# Patient Record
Sex: MALE | Race: WHITE | Employment: FULL TIME | URBAN - METROPOLITAN AREA
[De-identification: names, ages, dates, MRNs, and addresses within clinical notes are randomized per-mention and may not be internally consistent; named-entity substitution may affect disease eponyms.]

---

## 2017-08-20 ENCOUNTER — HOSPITAL ENCOUNTER (EMERGENCY)
Facility: CLINIC | Age: 47
Discharge: HOME | End: 2017-08-20
Attending: EMERGENCY MEDICINE | Admitting: EMERGENCY MEDICINE
Payer: COMMERCIAL

## 2017-08-20 VITALS
SYSTOLIC BLOOD PRESSURE: 172 MMHG | RESPIRATION RATE: 16 BRPM | DIASTOLIC BLOOD PRESSURE: 103 MMHG | OXYGEN SATURATION: 99 % | TEMPERATURE: 98.6 F

## 2017-08-20 DIAGNOSIS — K08.89 TOOTH PAIN: ICD-10-CM

## 2017-08-20 DIAGNOSIS — K04.7 DENTAL INFECTION: ICD-10-CM

## 2017-08-20 PROCEDURE — 99285 EMERGENCY DEPT VISIT HI MDM: CPT | Mod: 25

## 2017-08-20 PROCEDURE — 96372 THER/PROPH/DIAG INJ SC/IM: CPT

## 2017-08-20 PROCEDURE — 64400 NJX AA&/STRD TRIGEMINAL NRV: CPT

## 2017-08-20 PROCEDURE — 25000132 ZZH RX MED GY IP 250 OP 250 PS 637: Performed by: EMERGENCY MEDICINE

## 2017-08-20 PROCEDURE — 25000128 H RX IP 250 OP 636: Performed by: EMERGENCY MEDICINE

## 2017-08-20 RX ORDER — HYDROCODONE BITARTRATE AND ACETAMINOPHEN 5; 325 MG/1; MG/1
1 TABLET ORAL ONCE
Status: COMPLETED | OUTPATIENT
Start: 2017-08-20 | End: 2017-08-20

## 2017-08-20 RX ORDER — CLINDAMYCIN HCL 300 MG
300 CAPSULE ORAL 3 TIMES DAILY
Qty: 21 CAPSULE | Refills: 0 | Status: SHIPPED | OUTPATIENT
Start: 2017-08-20 | End: 2017-08-27

## 2017-08-20 RX ORDER — BUPIVACAINE HYDROCHLORIDE 5 MG/ML
1 INJECTION, SOLUTION PERINEURAL ONCE
Status: DISCONTINUED | OUTPATIENT
Start: 2017-08-20 | End: 2017-08-20 | Stop reason: HOSPADM

## 2017-08-20 RX ORDER — HYDROCODONE BITARTRATE AND ACETAMINOPHEN 5; 325 MG/1; MG/1
1 TABLET ORAL EVERY 4 HOURS PRN
Qty: 10 TABLET | Refills: 0 | Status: SHIPPED | OUTPATIENT
Start: 2017-08-20

## 2017-08-20 RX ADMIN — HYDROCODONE BITARTRATE AND ACETAMINOPHEN 1 TABLET: 5; 325 TABLET ORAL at 20:20

## 2017-08-20 RX ADMIN — HYDROMORPHONE HYDROCHLORIDE 2 MG: 1 INJECTION, SOLUTION INTRAMUSCULAR; INTRAVENOUS; SUBCUTANEOUS at 20:34

## 2017-08-20 ASSESSMENT — ENCOUNTER SYMPTOMS
SHORTNESS OF BREATH: 0
COUGH: 0
CHILLS: 0
FEVER: 0

## 2017-08-20 NOTE — ED AVS SNAPSHOT
[facility] Emergency Department    [address]    [facility] [address]    Phone:  [phone]    Fax:  [phone]                                       Kayden Schmitz   MRN: [id number]    Department:  [facility] Emergency Department   Date of Visit:  8/20/2017           After Visit Summary Signature Page     I have received my discharge instructions, and my questions have been answered. I have discussed any challenges I see with this plan with the nurse or doctor.    ..........................................................................................................................................  Patient/Patient Representative Signature      ..........................................................................................................................................  Patient Representative Print Name and Relationship to Patient    ..................................................               ................................................  Date                                            Time    ..........................................................................................................................................  Reviewed by Signature/Title    ...................................................              ..............................................  Date                                                            Time

## 2017-08-20 NOTE — ED AVS SNAPSHOT
[facility] Emergency Department    [address]    [facility] [address]    Phone:  [phone]    Fax:  [phone]                                       Kayden Schmitz   MRN: [id number]    Department:  [facility] Emergency Department   Date of Visit:  8/20/2017           Patient Information     Date Of Birth          1970        Your diagnoses for this visit were:     Dental infection     Tooth pain        You were seen by Sangita Askew MD.      Follow-up Information     Go to your dentist.    Why:  as previously scheduled for tomorrow        Follow up with [facility] Emergency Department.    Specialty:  EMERGENCY MEDICINE    Why:  if rapid facial swelling, difficulty breathing, vomiting/fevers, or other concerns    Contact information:    [address] [facility] [id number] [phone]        Discharge Instructions         Dental Abscess    A dental abscess is an infection of the tooth socket. It often starts with a crack or cavity in the tooth. A pocket of pus forms between the tooth and the bone. The infection causes pain and swelling of the gum, cheek, or jaw. The pain is often made worse by drinking hot or cold fluids, or biting on hard foods. Pain may be felt in the facial sinus or in the ear. A severe infection can interfere with swallowing and breathing.  Causes    Cavities    Trauma    Previous dental work  Symptoms    Pain    Swelling around the tooth or face and cheek    Redness    Bad breath    Bad taste in the mouth    Fever  You will be started on an antibiotic. However, final treatment requires drainage of the pus. This can be done by removing the tooth or performing a root canal. A root canal is done by an oral surgeon. It involves drilling an opening in the tooth to drain the pus. After the infection has healed, a crown is placed over the tooth.  Home care  The following guidelines will help you care for  "your abscess at home:    Avoid hot and cold foods and liquids, since your tooth may be sensitive to temperature changes.    If your tooth is chipped or cracked, or if there is a large open cavity, apply oil of cloves (available over-the-counter in drug stores) directly to the tooth to reduce pain. Some drugstores carry an over-the-counter \"toothache kit.\" This contains oil of cloves and a paste, which can be applied over the exposed tooth to decrease sensitivity.    Apply an ice pack (ice cubes in a plastic bag, wrapped in a towel) over the injured area for 10 to 20 minutes every 1 to 2 hours the first day for pain relief. Continue this 3 to 4 times a day until the pain and swelling goes away.    You can take acetaminophen or ibuprofen for pain, unless you were given a different pain medicine to use. (Note: If you have chronic liver or kidney disease, have ever had a stomach ulcer or gastrointestinal bleeding, or are taking blood-thinning medicines, talk with your healthcare provider before using these medicines.)    An antibiotic will be prescribed. Take it as directed until completed, even if you are feeling better sooner.  Follow-up care  Follow up as advised with a dentist or oral surgeon. Even though your pain may improve with the treatment given today, only a dentist or oral surgeon can provide full treatment for this problem.    If a culture was done, you will be notified if the treatment needs to be changed. You can call in as directed for the results.    If X-rays were taken, they will be reviewed by a specialist. You will be notified of the results, especially if they affect treatment.  Call 911  Call emergency services right away if any of these occur:    Trouble breathing or swallowing, or wheezing    Hoarse voice or trouble speaking    Confusion    Extreme drowsiness or trouble awakening    Fainting or loss of consciousness    Rapid heart rate  When to seek medical advice  Call your healthcare provider " right away if any of these occur:    Your face or eyelid becomes swollen or red.    Pain worsens or spreads to the neck.    You develop a fever of 100.4 F (38 C) or higher.    You have unusual drowsiness, a headache or stiff neck, or weakness.    Pus drains from the gum or tooth.    You are unable to open your mouth wide.  Date Last Reviewed: 7/30/2015 2000-2017 [employer]. [address] [place] [address], [place] [address]. All rights reserved. This information is not intended as a substitute for professional medical care. Always follow your healthcare professional's instructions.          24 Hour Appointment Hotline       To make an appointment at any [facility], call [phone] ([phone]). If you don't have a family doctor or clinic, we will help you find one. [facility] clinics are conveniently located to serve the needs of you and your family.             Review of your medicines      START taking        Dose / Directions Last dose taken    clindamycin 300 MG capsule   Commonly known as:  CLEOCIN   Dose:  300 mg   Quantity:  21 capsule        Take 1 capsule (300 mg) by mouth 3 times daily for 7 days   Refills:  0        HYDROcodone-acetaminophen 5-325 MG per tablet   Commonly known as:  NORCO   Dose:  1 tablet   Quantity:  10 tablet        Take 1 tablet by mouth every 4 hours as needed for moderate to severe pain   Refills:  0          Our records show that you are taking the medicines listed below. If these are incorrect, please call your family doctor or clinic.        Dose / Directions Last dose taken    diphenhydrAMINE 25 MG tablet   Commonly known as:  BENADRYL   Dose:  50 mg   Quantity:  56 tablet        Take 2 tablets (50 mg) by mouth every 6 hours as needed for itching   Refills:  0        EPINEPHrine 0.3 MG/0.3ML injection 2-pack   Commonly known as:  EPIPEN/ADRENACLICK/or ANY BX GENERIC EQUIV   Dose:  0.3 mg   Quantity:  2 each        Inject 0.3 mLs (0.3 mg) into the muscle once  as needed for anaphylaxis   Refills:  0        MEDROL (KAREN) 4 MG tablet   Quantity:  21   Generic drug:  methylPREDNISolone        AS DIRECTED   Refills:  0        multivitamin per tablet   Quantity:  100        ONE DAILY   Refills:  1 YEAR        predniSONE 20 MG tablet   Commonly known as:  DELTASONE   Quantity:  9 tablet        Take three tablets (= 60mg) each day for three days   Refills:  0        ROBITUSSIN A-C  MG/5ML Syrp   Quantity:  8 OZ   Generic drug:  CODEINE-GUAIFENESIN        ONE TO TWO TEASPOONS EVER 4 HOURS, AS NEEDED FOR COUGH   Refills:  0        ZITHROMAX Z-KAREN 250 MG Caps   Quantity:  6        2 CAPSULES TODAY, THEN 1 CAPSULE DAILY   Refills:  0                Prescriptions were sent or printed at these locations (2 Prescriptions)                   Other Prescriptions                Printed at Department/Unit printer (2 of 2)         clindamycin (CLEOCIN) 300 MG capsule               HYDROcodone-acetaminophen (NORCO) 5-325 MG per tablet                Orders Needing Specimen Collection     None      Pending Results     No orders found from 8/18/2017 to 8/21/2017.            Pending Culture Results     No orders found from 8/18/2017 to 8/21/2017.            Pending Results Instructions     If you had any lab results that were not finalized at the time of your Discharge, you can call the ED Lab Result RN at [phone]. You will be contacted by this team for any positive Lab results or changes in treatment. The nurses are available 7 days a week from 10A to 6:30P.  You can leave a message 24 hours per day and they will return your call.        Test Results From Your Hospital Stay               Clinical Quality Measure: Blood Pressure Screening     Your blood pressure was checked while you were in the emergency department today. The last reading we obtained was  BP: (!) 193/103 . Please read the guidelines below about what these numbers mean and what you should do about them.  If your  "systolic blood pressure (the top number) is less than 120 and your diastolic blood pressure (the bottom number) is less than 80, then your blood pressure is normal. There is nothing more that you need to do about it.  If your systolic blood pressure (the top number) is 120-139 or your diastolic blood pressure (the bottom number) is 80-89, your blood pressure may be higher than it should be. You should have your blood pressure rechecked within a year by a primary care provider.  If your systolic blood pressure (the top number) is 140 or greater or your diastolic blood pressure (the bottom number) is 90 or greater, you may have high blood pressure. High blood pressure is treatable, but if left untreated over time it can put you at risk for heart attack, stroke, or kidney failure. You should have your blood pressure rechecked by a primary care provider within the next 4 weeks.  If your provider in the emergency department today gave you specific instructions to follow-up with your doctor or provider even sooner than that, you should follow that instruction and not wait for up to 4 weeks for your follow-up visit.        Thank you for choosing [facility]       Thank you for choosing [facility] for your care. Our goal is always to provide you with excellent care. Hearing back from our patients is one way we can continue to improve our services. Please take a few minutes to complete the written survey that you may receive in the mail after you visit with us. Thank you!        [employer] Information     [employer] lets you send messages to your doctor, view your test results, renew your prescriptions, schedule appointments and more. To sign up, go to [email or web address] . Click on \"Log in\" on the left side of the screen, which will take you to the Welcome page. Then click on \"Sign up Now\" on the right side of the page.     You will be asked to enter the access code listed below, as well as some personal information. Please " follow the directions to create your username and password.     Your access code is: [email or web address]  Expires: 2017  8:14 PM     Your access code will  in 90 days. If you need help or a new code, please call your [facility] clinic or [phone].        Care EveryWhere ID     This is your Care EveryWhere ID. This could be used by other organizations to access your [facility] medical records  [id number]        Equal Access to Services     GINA DEVRIES : Hadii aad ku hadasho Soomaali, waaxda luqadaha, qaybta kaalmada adeegyachrissie, corey canales . So [facility] [phone].    ATENCIÓN: Si habla español, tiene a brandt disposición servicios gratuitos de asistencia lingüística. Llame al [phone].    We comply with applicable federal civil rights laws and [place] laws. We do not discriminate on the basis of race, color, national origin, age, disability sex, sexual orientation or gender identity.            After Visit Summary       This is your record. Keep this with you and show to your community pharmacist(s) and doctor(s) at your next visit.

## 2017-08-20 NOTE — ED NOTES
Patient states he took Amoxicillin at 1620 for dental pain. Patient states pain has increased significantly as well. Patient states he started feeling his lips swelling, as well as facial swelling. No trouble breathing. ABCDs intact. Alert and oriented x 4.

## 2017-08-21 NOTE — ED NOTES
Pt stating that he is not here for an allergic reaction, he is here strictly for pain management.  MD at bedside and aware.  Pt denies any difficulty breathing, trouble handling secretions, rashes/hives, or facial numbness.

## 2017-08-21 NOTE — DISCHARGE INSTRUCTIONS
"  Dental Abscess    A dental abscess is an infection of the tooth socket. It often starts with a crack or cavity in the tooth. A pocket of pus forms between the tooth and the bone. The infection causes pain and swelling of the gum, cheek, or jaw. The pain is often made worse by drinking hot or cold fluids, or biting on hard foods. Pain may be felt in the facial sinus or in the ear. A severe infection can interfere with swallowing and breathing.  Causes    Cavities    Trauma    Previous dental work  Symptoms    Pain    Swelling around the tooth or face and cheek    Redness    Bad breath    Bad taste in the mouth    Fever  You will be started on an antibiotic. However, final treatment requires drainage of the pus. This can be done by removing the tooth or performing a root canal. A root canal is done by an oral surgeon. It involves drilling an opening in the tooth to drain the pus. After the infection has healed, a crown is placed over the tooth.  Home care  The following guidelines will help you care for your abscess at home:    Avoid hot and cold foods and liquids, since your tooth may be sensitive to temperature changes.    If your tooth is chipped or cracked, or if there is a large open cavity, apply oil of cloves (available over-the-counter in drug stores) directly to the tooth to reduce pain. Some drugstores carry an over-the-counter \"toothache kit.\" This contains oil of cloves and a paste, which can be applied over the exposed tooth to decrease sensitivity.    Apply an ice pack (ice cubes in a plastic bag, wrapped in a towel) over the injured area for 10 to 20 minutes every 1 to 2 hours the first day for pain relief. Continue this 3 to 4 times a day until the pain and swelling goes away.    You can take acetaminophen or ibuprofen for pain, unless you were given a different pain medicine to use. (Note: If you have chronic liver or kidney disease, have ever had a stomach ulcer or gastrointestinal bleeding, or are " taking blood-thinning medicines, talk with your healthcare provider before using these medicines.)    An antibiotic will be prescribed. Take it as directed until completed, even if you are feeling better sooner.  Follow-up care  Follow up as advised with a dentist or oral surgeon. Even though your pain may improve with the treatment given today, only a dentist or oral surgeon can provide full treatment for this problem.    If a culture was done, you will be notified if the treatment needs to be changed. You can call in as directed for the results.    If X-rays were taken, they will be reviewed by a specialist. You will be notified of the results, especially if they affect treatment.  Call 911  Call emergency services right away if any of these occur:    Trouble breathing or swallowing, or wheezing    Hoarse voice or trouble speaking    Confusion    Extreme drowsiness or trouble awakening    Fainting or loss of consciousness    Rapid heart rate  When to seek medical advice  Call your healthcare provider right away if any of these occur:    Your face or eyelid becomes swollen or red.    Pain worsens or spreads to the neck.    You develop a fever of 100.4 F (38 C) or higher.    You have unusual drowsiness, a headache or stiff neck, or weakness.    Pus drains from the gum or tooth.    You are unable to open your mouth wide.  Date Last Reviewed: 7/30/2015 2000-2017 [employer]. [address] [place] [address], [place] [address]. All rights reserved. This information is not intended as a substitute for professional medical care. Always follow your healthcare professional's instructions.

## 2017-08-21 NOTE — ED PROVIDER NOTES
History     Chief Complaint:  Allergic Reaction and Dental Pain    HPI   Kayden Schmitz is a 47 year old male who presents to the ED for evaluation of an allergic reaction and dental pain. The patient called the On-call dentist here today and was prescribed amoxicillin for his dental pain. The dental pain is his main concern here today. The patient reports that his 2nd to last molar on the left bottom side of his jaw is painful and has been progressively getting worse since its onset on Thursday. The patient notes his took the first does of amoxicillin at 4:20pm today and noticed lip swelling on the left side. The patient denies any trouble breathing or rash.  He has never had reaction to penicillins before.  Denies wheezing, abdominal pain, and vomiting.  He notes that he has had teeth pulled previously and a root canal conducted. Of note he has a dentist appointment scheduled for tomorrow morning.    Allergies:  Lisinopril     Medications:    Benadryl  Deltasone  Epipen  Robitussin  Zithromax  Medrol    Past Medical History:    Obesity    Past Surgical History:    Pilonidal Cyst Surgery  Hernia Repair  Gastric Bypass    Family History:    ALS    Social History:  Smoking status: Former, quit 2002  Alcohol use: Yes, 1 drink/month  Marital Status:   [2]     Review of Systems   Constitutional: Negative for chills and fever.   HENT: Positive for dental problem.    Respiratory: Negative for cough and shortness of breath.    Skin: Negative for rash.   All other systems reviewed and are negative.      Physical Exam     Patient Vitals for the past 24 hrs:   BP Temp Temp src Heart Rate Resp SpO2   08/20/17 1854 (!) 193/103 98.6  F (37  C) Oral 71 16 97 %       Physical Exam  Constitutional: Alert, attentive, GCS 15  HENT: no external facial or neck swelling   Nose: Nose normal.    Mouth/Throat: Oropharynx is clear, mucous membranes are moist, tooth #16 and tooth #17 tender to palpation (left mandibular  molars), erythema and edema along gumline, no drainage abscess, lips appear normal  Eyes: Normal conjunctiva. Pupils are equal, round, and reactive to light.   CV: regular rate and rhythm; no murmurs, rubs or gallups  Chest: Effort normal and breath sounds normal,  No wheezing, rhonchi or rales.   GI:  There is no tenderness. No distension. Normal bowel sounds  MSK: Normal range of motion.   Neurological: Alert, attentive  Skin: Skin is warm and dry, no hives or rash.      Emergency Department Course   Procedures:  PROCEDURE:  Dental Block  LOCATION:  Left inferior alveolar  ANESTHESIA: Regional block using 0.5% bupivacaine, total of 4 mL (2 attempts)   PROCEDURE NOTE: The patient tolerated the procedure well. Second attempt was performed as there was no relief with the first.  He did have a needle prick of his left upper lip with the second attempt.  Patient given gauze pad to hold pressure and bleeding stopped.      Emergency Department Course:  Past medical records, nursing notes, and vitals reviewed.  7:07pm: I performed an exam of the patient and obtained history, as documented above.    7:33pm: I performed a dental block procedure as indicated above.  8:08pm: I rechecked the patient. Explained findings to the patient.  Did not have pain relief with first dental block, so I injected 2 cc of bupivicaine with second attempt.  Still no relief.  Patient declined PO medication and requested IM.      I rechecked the patient. Findings and plan explained to the Patient. Patient discharged home with instructions regarding supportive care, medications, and reasons to return. The importance of close follow-up was reviewed.     Impression & Plan    Medical Decision Making:  The patient presents with a toothache and pain.  There is no abscess detected around the tooth amenable to incision and drainage. The differential diagnosis includes: cracked tooth syndrome, pulpitis, sub-apical abscess, amongst others. There is no  evidence of neck swelling, significant facial swelling, or Vivek's angina.  Patient did feel like his lips were slightly swollen after taking a dose of amoxicillin several hours before arrival.  There is no evidence of anaphylaxis on exam and he is not having any additional symptoms such as vomiting, abdominal pain, shortness of breath, rash, or wheezing.  Because he is concerned about continuing amoxicillin, I will change his antibiotic to Clindamycin. Patient did not have pain relief with nerve block and was given dose of IM dilaudid after he declined PO medications, as he feels they do not work well on him since his gastric bypass.   He has dental appointment tomorrow.   Return precautions discussed.      Diagnosis:    ICD-10-CM   1. Dental infection K04.7   2. Tooth pain K08.89       Disposition:  discharged to home    Discharge Medications:  New Prescriptions    CLINDAMYCIN (CLEOCIN) 300 MG CAPSULE    Take 1 capsule (300 mg) by mouth 3 times daily for 7 days    HYDROCODONE-ACETAMINOPHEN (NORCO) 5-325 MG PER TABLET    Take 1 tablet by mouth every 4 hours as needed for moderate to severe pain         Radha Gil  8/20/2017   [facility] EMERGENCY DEPARTMENT  Radha SMITH, am serving as a scribe at 7:07 PM on 8/20/2017 to document services personally performed by Sangita Askew MD based on my observations and the provider's statements to me.        Sangita Askew MD  08/20/17 2051

## 2017-08-21 NOTE — ED NOTES
Pt currently refusing Norco, stating that he wants to see how much relief he gets from the dental block.

## 2018-07-24 ENCOUNTER — HOSPITAL ENCOUNTER (OUTPATIENT)
Facility: CLINIC | Age: 48
Setting detail: OBSERVATION
Discharge: HOME | End: 2018-07-25
Attending: EMERGENCY MEDICINE | Admitting: INTERNAL MEDICINE
Payer: COMMERCIAL

## 2018-07-24 DIAGNOSIS — A04.72 C. DIFFICILE COLITIS: Primary | ICD-10-CM

## 2018-07-24 DIAGNOSIS — K92.2 GASTROINTESTINAL HEMORRHAGE, UNSPECIFIED GASTROINTESTINAL HEMORRHAGE TYPE: ICD-10-CM

## 2018-07-24 PROCEDURE — 85610 PROTHROMBIN TIME: CPT | Performed by: EMERGENCY MEDICINE

## 2018-07-24 PROCEDURE — 99285 EMERGENCY DEPT VISIT HI MDM: CPT | Mod: 25

## 2018-07-24 PROCEDURE — 85025 COMPLETE CBC W/AUTO DIFF WBC: CPT | Performed by: EMERGENCY MEDICINE

## 2018-07-24 PROCEDURE — 80053 COMPREHEN METABOLIC PANEL: CPT | Performed by: EMERGENCY MEDICINE

## 2018-07-24 PROCEDURE — 96374 THER/PROPH/DIAG INJ IV PUSH: CPT

## 2018-07-24 PROCEDURE — 83605 ASSAY OF LACTIC ACID: CPT | Performed by: EMERGENCY MEDICINE

## 2018-07-24 PROCEDURE — 86900 BLOOD TYPING SEROLOGIC ABO: CPT | Performed by: EMERGENCY MEDICINE

## 2018-07-24 PROCEDURE — 86850 RBC ANTIBODY SCREEN: CPT | Performed by: EMERGENCY MEDICINE

## 2018-07-24 PROCEDURE — 83690 ASSAY OF LIPASE: CPT | Performed by: EMERGENCY MEDICINE

## 2018-07-24 PROCEDURE — 86901 BLOOD TYPING SEROLOGIC RH(D): CPT | Performed by: EMERGENCY MEDICINE

## 2018-07-24 RX ORDER — ONDANSETRON 2 MG/ML
4 INJECTION INTRAMUSCULAR; INTRAVENOUS EVERY 30 MIN PRN
Status: DISCONTINUED | OUTPATIENT
Start: 2018-07-24 | End: 2018-07-25

## 2018-07-24 ASSESSMENT — ENCOUNTER SYMPTOMS
NAUSEA: 1
BLOOD IN STOOL: 1
FATIGUE: 1
APPETITE CHANGE: 1
DIAPHORESIS: 1

## 2018-07-24 NOTE — IP AVS SNAPSHOT
[facility] [facility]    [address]    [facility] [address]    Phone:  [phone]    Fax:  [phone]                                       After Visit Summary   7/24/2018    Kayden Schmitz    MRN: [id number]           After Visit Summary Signature Page     I have received my discharge instructions, and my questions have been answered. I have discussed any challenges I see with this plan with the nurse or doctor.    ..........................................................................................................................................  Patient/Patient Representative Signature      ..........................................................................................................................................  Patient Representative Print Name and Relationship to Patient    ..................................................               ................................................  Date                                            Time    ..........................................................................................................................................  Reviewed by Signature/Title    ...................................................              ..............................................  Date                                                            Time

## 2018-07-25 ENCOUNTER — APPOINTMENT (OUTPATIENT)
Dept: CT IMAGING | Facility: CLINIC | Age: 48
End: 2018-07-25
Attending: EMERGENCY MEDICINE
Payer: COMMERCIAL

## 2018-07-25 VITALS
TEMPERATURE: 97.7 F | HEIGHT: 67 IN | OXYGEN SATURATION: 99 % | RESPIRATION RATE: 16 BRPM | WEIGHT: 197.9 LBS | SYSTOLIC BLOOD PRESSURE: 135 MMHG | DIASTOLIC BLOOD PRESSURE: 74 MMHG | BODY MASS INDEX: 31.06 KG/M2 | HEART RATE: 83 BPM

## 2018-07-25 PROBLEM — K92.2 ACUTE GASTROINTESTINAL HEMORRHAGE: Status: ACTIVE | Noted: 2018-07-25

## 2018-07-25 PROBLEM — A04.72 C. DIFFICILE COLITIS: Status: ACTIVE | Noted: 2018-07-25

## 2018-07-25 LAB
ABO + RH BLD: NORMAL
ABO + RH BLD: NORMAL
ALBUMIN SERPL-MCNC: 3.4 G/DL (ref 3.4–5)
ALP SERPL-CCNC: 72 U/L (ref 40–150)
ALT SERPL W P-5'-P-CCNC: 27 U/L (ref 0–70)
ANION GAP SERPL CALCULATED.3IONS-SCNC: 4 MMOL/L (ref 3–14)
AST SERPL W P-5'-P-CCNC: 17 U/L (ref 0–45)
BASOPHILS # BLD AUTO: 0.1 10E9/L (ref 0–0.2)
BASOPHILS NFR BLD AUTO: 0.4 %
BILIRUB SERPL-MCNC: 0.6 MG/DL (ref 0.2–1.3)
BLD GP AB SCN SERPL QL: NORMAL
BLOOD BANK CMNT PATIENT-IMP: NORMAL
BUN SERPL-MCNC: 25 MG/DL (ref 7–30)
C COLI+JEJUNI+LARI FUSA STL QL NAA+PROBE: NOT DETECTED
C DIFF TOX B STL QL: POSITIVE
CALCIUM SERPL-MCNC: 8.3 MG/DL (ref 8.5–10.1)
CHLORIDE SERPL-SCNC: 103 MMOL/L (ref 94–109)
CO2 SERPL-SCNC: 29 MMOL/L (ref 20–32)
CREAT SERPL-MCNC: 0.8 MG/DL (ref 0.66–1.25)
DIFFERENTIAL METHOD BLD: ABNORMAL
EC STX1 GENE STL QL NAA+PROBE: NOT DETECTED
EC STX2 GENE STL QL NAA+PROBE: NOT DETECTED
ENTERIC PATHOGEN COMMENT: NORMAL
EOSINOPHIL # BLD AUTO: 0.1 10E9/L (ref 0–0.7)
EOSINOPHIL NFR BLD AUTO: 0.5 %
ERYTHROCYTE [DISTWIDTH] IN BLOOD BY AUTOMATED COUNT: 14.1 % (ref 10–15)
GFR SERPL CREATININE-BSD FRML MDRD: >90 ML/MIN/1.7M2
GLUCOSE SERPL-MCNC: 124 MG/DL (ref 70–99)
HCT VFR BLD AUTO: 36.8 % (ref 40–53)
HGB BLD-MCNC: 11.5 G/DL (ref 13.3–17.7)
HGB BLD-MCNC: 12 G/DL (ref 13.3–17.7)
HGB BLD-MCNC: 12.2 G/DL (ref 13.3–17.7)
IMM GRANULOCYTES # BLD: 0.1 10E9/L (ref 0–0.4)
IMM GRANULOCYTES NFR BLD: 0.5 %
INR PPP: 1.26 (ref 0.86–1.14)
LACTATE BLD-SCNC: 0.8 MMOL/L (ref 0.7–2)
LIPASE SERPL-CCNC: 95 U/L (ref 73–393)
LYMPHOCYTES # BLD AUTO: 2.1 10E9/L (ref 0.8–5.3)
LYMPHOCYTES NFR BLD AUTO: 17.9 %
MCH RBC QN AUTO: 29.1 PG (ref 26.5–33)
MCHC RBC AUTO-ENTMCNC: 33.2 G/DL (ref 31.5–36.5)
MCV RBC AUTO: 88 FL (ref 78–100)
MONOCYTES # BLD AUTO: 0.8 10E9/L (ref 0–1.3)
MONOCYTES NFR BLD AUTO: 6.7 %
NEUTROPHILS # BLD AUTO: 8.7 10E9/L (ref 1.6–8.3)
NEUTROPHILS NFR BLD AUTO: 74 %
NOROV GI+II ORF1-ORF2 JNC STL QL NAA+PR: NOT DETECTED
NRBC # BLD AUTO: 0 10*3/UL
NRBC BLD AUTO-RTO: 0 /100
PLATELET # BLD AUTO: 306 10E9/L (ref 150–450)
POTASSIUM SERPL-SCNC: 4 MMOL/L (ref 3.4–5.3)
PROT SERPL-MCNC: 6.9 G/DL (ref 6.8–8.8)
RBC # BLD AUTO: 4.19 10E12/L (ref 4.4–5.9)
RVA NSP5 STL QL NAA+PROBE: NOT DETECTED
SALMONELLA SP RPOD STL QL NAA+PROBE: NOT DETECTED
SHIGELLA SP+EIEC IPAH STL QL NAA+PROBE: NOT DETECTED
SODIUM SERPL-SCNC: 136 MMOL/L (ref 133–144)
SPECIMEN EXP DATE BLD: NORMAL
SPECIMEN SOURCE: ABNORMAL
UPPER GI ENDOSCOPY: NORMAL
V CHOL+PARA RFBL+TRKH+TNAA STL QL NAA+PR: NOT DETECTED
WBC # BLD AUTO: 11.8 10E9/L (ref 4–11)
Y ENTERO RECN STL QL NAA+PROBE: NOT DETECTED

## 2018-07-25 PROCEDURE — 25000128 H RX IP 250 OP 636: Performed by: EMERGENCY MEDICINE

## 2018-07-25 PROCEDURE — 36415 COLL VENOUS BLD VENIPUNCTURE: CPT | Performed by: INTERNAL MEDICINE

## 2018-07-25 PROCEDURE — 96374 THER/PROPH/DIAG INJ IV PUSH: CPT

## 2018-07-25 PROCEDURE — 0DB68ZX EXCISION OF STOMACH, VIA NATURAL OR ARTIFICIAL OPENING ENDOSCOPIC, DIAGNOSTIC: ICD-10-PCS | Performed by: INTERNAL MEDICINE

## 2018-07-25 PROCEDURE — 25000125 ZZHC RX 250: Performed by: EMERGENCY MEDICINE

## 2018-07-25 PROCEDURE — 88305 TISSUE EXAM BY PATHOLOGIST: CPT | Performed by: INTERNAL MEDICINE

## 2018-07-25 PROCEDURE — 87506 IADNA-DNA/RNA PROBE TQ 6-11: CPT | Performed by: EMERGENCY MEDICINE

## 2018-07-25 PROCEDURE — G0378 HOSPITAL OBSERVATION PER HR: HCPCS

## 2018-07-25 PROCEDURE — 88305 TISSUE EXAM BY PATHOLOGIST: CPT | Mod: 26 | Performed by: INTERNAL MEDICINE

## 2018-07-25 PROCEDURE — 74177 CT ABD & PELVIS W/CONTRAST: CPT

## 2018-07-25 PROCEDURE — 87493 C DIFF AMPLIFIED PROBE: CPT | Performed by: EMERGENCY MEDICINE

## 2018-07-25 PROCEDURE — 43239 EGD BIOPSY SINGLE/MULTIPLE: CPT | Performed by: INTERNAL MEDICINE

## 2018-07-25 PROCEDURE — 25000125 ZZHC RX 250: Performed by: INTERNAL MEDICINE

## 2018-07-25 PROCEDURE — 25000128 H RX IP 250 OP 636: Performed by: INTERNAL MEDICINE

## 2018-07-25 PROCEDURE — C9113 INJ PANTOPRAZOLE SODIUM, VIA: HCPCS | Performed by: INTERNAL MEDICINE

## 2018-07-25 PROCEDURE — 85018 HEMOGLOBIN: CPT | Performed by: INTERNAL MEDICINE

## 2018-07-25 PROCEDURE — 25000132 ZZH RX MED GY IP 250 OP 250 PS 637: Performed by: INTERNAL MEDICINE

## 2018-07-25 PROCEDURE — G0500 MOD SEDAT ENDO SERVICE >5YRS: HCPCS | Performed by: INTERNAL MEDICINE

## 2018-07-25 PROCEDURE — 87177 OVA AND PARASITES SMEARS: CPT | Performed by: EMERGENCY MEDICINE

## 2018-07-25 PROCEDURE — 12000007 ZZH R&B INTERMEDIATE

## 2018-07-25 PROCEDURE — 87209 SMEAR COMPLEX STAIN: CPT | Performed by: EMERGENCY MEDICINE

## 2018-07-25 PROCEDURE — 25800025 ZZH RX 258: Performed by: INTERNAL MEDICINE

## 2018-07-25 PROCEDURE — 99222 1ST HOSP IP/OBS MODERATE 55: CPT | Mod: AI | Performed by: INTERNAL MEDICINE

## 2018-07-25 RX ORDER — DEXTROSE MONOHYDRATE, SODIUM CHLORIDE, AND POTASSIUM CHLORIDE 50; 1.49; 4.5 G/1000ML; G/1000ML; G/1000ML
INJECTION, SOLUTION INTRAVENOUS CONTINUOUS
Status: DISCONTINUED | OUTPATIENT
Start: 2018-07-25 | End: 2018-07-25

## 2018-07-25 RX ORDER — HYDROCODONE BITARTRATE AND ACETAMINOPHEN 5; 325 MG/1; MG/1
1 TABLET ORAL EVERY 4 HOURS PRN
Status: DISCONTINUED | OUTPATIENT
Start: 2018-07-25 | End: 2018-07-25 | Stop reason: HOSPADM

## 2018-07-25 RX ORDER — CHOLECALCIFEROL (VITAMIN D3) 50 MCG
1 TABLET ORAL DAILY
COMMUNITY

## 2018-07-25 RX ORDER — ALBUTEROL SULFATE 90 UG/1
1-2 AEROSOL, METERED RESPIRATORY (INHALATION) EVERY 4 HOURS PRN
COMMUNITY

## 2018-07-25 RX ORDER — NALOXONE HYDROCHLORIDE 0.4 MG/ML
.1-.4 INJECTION, SOLUTION INTRAMUSCULAR; INTRAVENOUS; SUBCUTANEOUS
Status: DISCONTINUED | OUTPATIENT
Start: 2018-07-25 | End: 2018-07-25 | Stop reason: HOSPADM

## 2018-07-25 RX ORDER — IOPAMIDOL 755 MG/ML
500 INJECTION, SOLUTION INTRAVASCULAR ONCE
Status: COMPLETED | OUTPATIENT
Start: 2018-07-25 | End: 2018-07-25

## 2018-07-25 RX ORDER — NICOTINE POLACRILEX 4 MG/1
20 GUM, CHEWING ORAL DAILY
COMMUNITY

## 2018-07-25 RX ORDER — MAGNESIUM 200 MG
1000 TABLET ORAL WEEKLY
COMMUNITY

## 2018-07-25 RX ORDER — FLUMAZENIL 0.1 MG/ML
0.2 INJECTION, SOLUTION INTRAVENOUS
Status: DISCONTINUED | OUTPATIENT
Start: 2018-07-25 | End: 2018-07-25 | Stop reason: HOSPADM

## 2018-07-25 RX ORDER — FENTANYL CITRATE 50 UG/ML
INJECTION, SOLUTION INTRAMUSCULAR; INTRAVENOUS PRN
Status: DISCONTINUED | OUTPATIENT
Start: 2018-07-25 | End: 2018-07-25 | Stop reason: HOSPADM

## 2018-07-25 RX ORDER — SILDENAFIL 100 MG/1
50-100 TABLET, FILM COATED ORAL DAILY PRN
COMMUNITY

## 2018-07-25 RX ORDER — ACETAMINOPHEN 500 MG
500 TABLET ORAL EVERY 6 HOURS PRN
COMMUNITY

## 2018-07-25 RX ORDER — ACETAMINOPHEN 325 MG/1
650 TABLET ORAL EVERY 4 HOURS PRN
Status: DISCONTINUED | OUTPATIENT
Start: 2018-07-25 | End: 2018-07-25 | Stop reason: HOSPADM

## 2018-07-25 RX ORDER — VANCOMYCIN HYDROCHLORIDE 50 MG/ML
125 KIT ORAL 4 TIMES DAILY
Qty: 100 ML | Refills: 0 | Status: SHIPPED | OUTPATIENT
Start: 2018-07-25 | End: 2018-08-04

## 2018-07-25 RX ORDER — FEXOFENADINE HCL 180 MG/1
180 TABLET ORAL DAILY PRN
COMMUNITY

## 2018-07-25 RX ORDER — ONDANSETRON 4 MG/1
4 TABLET, ORALLY DISINTEGRATING ORAL EVERY 6 HOURS PRN
Status: DISCONTINUED | OUTPATIENT
Start: 2018-07-25 | End: 2018-07-25 | Stop reason: HOSPADM

## 2018-07-25 RX ORDER — ONDANSETRON 2 MG/ML
4 INJECTION INTRAMUSCULAR; INTRAVENOUS EVERY 6 HOURS PRN
Status: DISCONTINUED | OUTPATIENT
Start: 2018-07-25 | End: 2018-07-25 | Stop reason: HOSPADM

## 2018-07-25 RX ORDER — FERROUS SULFATE 325(65) MG
325 TABLET ORAL 2 TIMES DAILY
COMMUNITY

## 2018-07-25 RX ORDER — LIDOCAINE 40 MG/G
CREAM TOPICAL
Status: DISCONTINUED | OUTPATIENT
Start: 2018-07-25 | End: 2018-07-25 | Stop reason: HOSPADM

## 2018-07-25 RX ORDER — VANCOMYCIN HYDROCHLORIDE 50 MG/ML
125 KIT ORAL 4 TIMES DAILY
Status: DISCONTINUED | OUTPATIENT
Start: 2018-07-25 | End: 2018-07-25 | Stop reason: HOSPADM

## 2018-07-25 RX ORDER — CETIRIZINE HYDROCHLORIDE 10 MG/1
10 TABLET ORAL DAILY
COMMUNITY

## 2018-07-25 RX ORDER — IBUPROFEN 200 MG
200-400 TABLET ORAL EVERY 4 HOURS PRN
Status: ON HOLD | COMMUNITY
End: 2018-07-25

## 2018-07-25 RX ADMIN — HYDROCODONE BITARTRATE AND ACETAMINOPHEN 1 TABLET: 5; 325 TABLET ORAL at 03:25

## 2018-07-25 RX ADMIN — IOPAMIDOL 100 ML: 755 INJECTION, SOLUTION INTRAVENOUS at 00:39

## 2018-07-25 RX ADMIN — Medication 1 MG: at 03:25

## 2018-07-25 RX ADMIN — POTASSIUM CHLORIDE, DEXTROSE MONOHYDRATE AND SODIUM CHLORIDE: 150; 5; 450 INJECTION, SOLUTION INTRAVENOUS at 14:51

## 2018-07-25 RX ADMIN — SODIUM CHLORIDE 65 ML: 900 INJECTION, SOLUTION INTRAVENOUS at 00:39

## 2018-07-25 RX ADMIN — POTASSIUM CHLORIDE, DEXTROSE MONOHYDRATE AND SODIUM CHLORIDE: 150; 5; 450 INJECTION, SOLUTION INTRAVENOUS at 03:25

## 2018-07-25 RX ADMIN — PANTOPRAZOLE SODIUM 40 MG: 40 INJECTION, POWDER, FOR SOLUTION INTRAVENOUS at 13:05

## 2018-07-25 RX ADMIN — VANCOMYCIN HYDROCHLORIDE 125 MG: KIT at 18:07

## 2018-07-25 RX ADMIN — PANTOPRAZOLE SODIUM 40 MG: 40 INJECTION, POWDER, FOR SOLUTION INTRAVENOUS at 01:23

## 2018-07-25 ASSESSMENT — ACTIVITIES OF DAILY LIVING (ADL)
RETIRED_EATING: 0-->INDEPENDENT
BATHING: 0-->INDEPENDENT
ADLS_ACUITY_SCORE: 9
SWALLOWING: 0-->SWALLOWS FOODS/LIQUIDS WITHOUT DIFFICULTY
ADLS_ACUITY_SCORE: 9
TRANSFERRING: 0-->INDEPENDENT
ADLS_ACUITY_SCORE: 13
TOILETING: 0-->INDEPENDENT
AMBULATION: 0-->INDEPENDENT
COGNITION: 0 - NO COGNITION ISSUES REPORTED
FALL_HISTORY_WITHIN_LAST_SIX_MONTHS: NO
ADLS_ACUITY_SCORE: 9
DRESS: 0-->INDEPENDENT
RETIRED_COMMUNICATION: 0-->UNDERSTANDS/COMMUNICATES WITHOUT DIFFICULTY

## 2018-07-25 NOTE — ED TRIAGE NOTES
Pt Dx with C. Diff in mid-June, finished abx July 8th. Today noticed 2 hard, black BMs followed by 2 red, oily, loose stools. Also reports excessive sweating lately. Reports decreased appetite and nausea. ABCs intact, pt A&Ox3

## 2018-07-25 NOTE — ED PROVIDER NOTES
History     Chief Complaint:  Melena    HPI   Kayden Schmitz is a 48 year old male with a history of obesity s/p cholecystectomy who presents with melena. The patient reports that early [place] He states that he ate chicken off the floor, and thinks he may have picked something up because when he returned from his trip he developed a multitude of symptoms including difficulty walking, dizziness and intermittent abdominal pain, diaphoresis, nausea, vomiting and diarrhea. However, he reports that his most concerning symptom is having black and bloody stools. He notes he visited a doctor where he was diagnosed with C. Diff and started a course of antibiotics and a BRAT diet. He states that most of his symptoms resolved, with the exception of the diaphoresis and fatigue. However, this morning he reports he had 2 hard black stools. He notes that when he went to walk during his lunch break, he became excessively sweaty and felt fatigued. He states that when he returned from dinner about 3 hours ago, he went to the bathroom and had 2 bloody stools. He notes he called the nurses line who referred him to the ED for evaluation. He reports that he additionally has a decreased appetite and nausea currently.    Allergies:  Lisinopril     Medications:    Benadryl  Epipen  Medrol  Robitussin    Past Medical History:    Obesity    Past Surgical History:    Pilonidal cyst surgery  Right inguinal hernia  Cholecystectomy  Gastric bypass    Family History:    ALS    Social History:  Marital Status:   [2]  Smoking status: Former smoker, quit 2002  Alcohol use: Yes    Review of Systems   Constitutional: Positive for appetite change (reduced), diaphoresis and fatigue.   Gastrointestinal: Positive for blood in stool and nausea.   All other systems reviewed and are negative.    Physical Exam     Patient Vitals for the past 24 hrs:   BP Temp Temp src Pulse Heart Rate Resp SpO2    07/25/18 0015 111/73 - - 86 86 - -   07/24/18 2245 109/77 97.9  F (36.6  C) Oral 96 96 14 100 %     Physical Exam  Constitutional:  Appears well-developed and well-nourished. Alert. Conversant. Non toxic.  HENT:   Head: Atraumatic.   Nose: Nose normal.  Mouth/Throat: Oral mucosa is clear and moist. no trismus. Pharynx normal. Tonsils symmetric. No tonsillar enlargement, erythema, or exudate.  Eyes: Conjunctivae normal. EOM normal. Pupils equal, round, and reactive to light. No scleral icterus.   Neck: Normal range of motion. Neck supple. No tracheal deviation present.   Cardiovascular: Normal rate, regular rhythm. No gallop. No friction rub. No murmur heard. Symmetric radial artery pulses   Pulmonary/Chest: Effort normal. No stridor. No respiratory distress. No wheezes. No rales. No rhonchi . No tenderness.   Abdominal: Soft. Bowel sounds normal. No distension. No mass, HSM. Mild epigastric tenderness. No rebound. No guarding. No CVA tenderness  Rectal: small non-thrombosed hemorrhoid. No fissure. No site of bleeding  Musculoskeletal:   RUE: Normal range of motion. No tenderness. No deformity  LUE: Normal range of motion. No tenderness. No deformity  RLE: Normal range of motion. No edema. No tenderness. No deformity  LLE: Normal range of motion. No edema. No tenderness. No deformity  Lymph: No cervical adenopathy.   Neurological: Alert and oriented to person, place, and time. Normal strength. CN II-VII intact. No sensory deficit. GCS eye subscore is 4. GCS verbal subscore is 5. GCS motor subscore is 6. Normal coordination   Skin: Skin is warm and dry. No rash noted. No pallor. Normal capillary refill.  Psychiatric:  Normal mood. Normal affect.     Emergency Department Course   Imaging:  Radiographic findings were communicated with the patient who voiced understanding of the findings.    CT Abdomen Pelvis w Contrast:  No cause of acute pain identified in the abdomen or pelvis.  As read by  Radiology.    Laboratory:  CBC: WBC 11.8 (H), HGB 12.2 (L), o/w WNL ()  CMP: Glucose 124 (H), Calcium 8.3 (L), o/w WNL (Creatinine 0.80)  INR: 1.26 (H)  Lipase: 95  Lactic acid whole body: 0.8  ABO/RH type and screen: O Negative, Negative Antibodies    Ova and Parasite Exam Routine: in process  Enteric Bacteria and Virus Panel by [facility] Stool: in process  Clostridium difficile toxin B PCR: in process    Interventions:  0123: Protonix 40 mg IV    Emergency Department Course:  Past medical records, nursing notes, and vitals reviewed.  2303: I performed an exam of the patient and obtained history, as documented above.   IV inserted and blood drawn.  The patient was sent for an abdomen CT while in the emergency department, findings above.    0054: I rechecked the patient. Explained findings to the patient.    0107: I spoke to Dr. Smith of the hospitalist service who accepts the patient for admission.     Findings and plan explained to the patient who consents to admission.     Discussed the patient with Dr. Smith, who will admit the patient to an inpatient bed for further monitoring, evaluation, and treatment.     Impression & Plan    Medical Decision Making:  Kayden Schmitz is a 48 year old male who came to the ER today with his fiancée for evaluation of 2 black stools this morning, suspicious for melena, with 2 episodes of frankly bloody liquid stool this evening.  He does have a recent history of a gastrointestinal illness and a positive C. difficile PCR through [facility].  He was treated with a course of antibiotics for that and got better and has been back to normal more or less for the past couple of weeks.  In addition to his stools today he also has some intermittent epigastric abdominal pain.  At this point no clear evidence for colitis or recurrent bacterial infection.  No fever, leukocytosis.  I do not think this represents a recurrence of C. difficile and will hold off on antibiotics for now.   Concern is for possible bleeding stomach ulcer.  He is currently hematologically stable and is not orthostatically hypotensive, but is symptomatic with standing.  I see that his hemoglobin is dropped 4 g from 16-12 compared to recent lab from 2 weeks ago.  This does suggest significant blood loss although exactly how acute is is unclear.  He is not anticoagulated or coagulopathic.  Although he is currently not extremis and does not require emergent blood transfusion, I do think admission for serial hemoglobins and potential endoscopy is indicated.  Discussed with hospitalist who agreed.  Patient and his fiancée agree with this plan of care as well.    Diagnosis:    ICD-10-CM   1. Gastrointestinal hemorrhage, unspecified gastrointestinal hemorrhage type K92.2       Disposition:  Admitted to inpatient in the care of Dr. Her Rosalba Jameson  7/24/2018   [facility] EMERGENCY DEPARTMENT  I, Rosalba Jameson, am serving as a scribe at 11:03 PM on 7/24/2018 to document services personally performed by Jama Hope MD based on my observations and the provider's statements to me.        Jama Hope MD  07/25/18 0638

## 2018-07-25 NOTE — SIGNIFICANT EVENT
Per micro lab  + c Diff    Primary RN Ivelisse Boyle made aware  Paged Dr. Summers with this information

## 2018-07-25 NOTE — PLAN OF CARE
Problem: Patient Care Overview  Goal: Plan of Care/Patient Progress Review  Outcome: Improving  A&ox4, VSS.  No stools since this morning, passing flatus.  Tolerated regular diet.  Up independently.  Denies pain or nausea.  Stated he wanted to be discharged.  Dr. Summers updated, plan for discharge home this evening.  Awaiting discharge orders.

## 2018-07-25 NOTE — PLAN OF CARE
"Problem: Patient Care Overview  Goal: Plan of Care/Patient Progress Review  Up independently in room. Reporting x3 black, bloody stools this morning but now is just \"passing gas\". C diff is positive. Pt is currently down at EGD.      "

## 2018-07-25 NOTE — OR NURSING
EGD with biopsies in endoscopy. Tolerated well. Report called to station nurse. Recovery for 30 min then transferred back to station on stretcher. Stable upon discharge.

## 2018-07-25 NOTE — PROGRESS NOTES
Infection Prevention:    Patient placed on Enteric precautions because of loose stool with possible infectious etiology. Please contact Infection Prevention with any questions/concerns at *[phone].    Courtney Tucker, ICP

## 2018-07-25 NOTE — PROCEDURES
PRE-PROCEDURE H&P    CHIEF COMPLAINT / REASON FOR PROCEDURE:  melena    PERTINENT HISTORY :    Past Medical History:   Diagnosis Date     Obesity, unspecified       Past Surgical History:   Procedure Laterality Date     HC EXCISION PILONIDAL LESION SIMPLE  1989    pilonidal cyst surg     HC REPAIR ING HERNIA,5+Y/O,REDUCIBL  11/20/03    right         Bleeding tendencies:  No    Relevant Family History:  NONE     Relevant Social History:  NONE      A relevant review of systems was performed and was negative      ALLERGIES/SENSITIVITIES:   Allergies   Allergen Reactions     Fish      Throat swells     Lisinopril      No Known Allergies        CURRENT MEDICATIONS:   No current outpatient prescriptions on file.        PRE-SEDATION ASSESSMENT:    Lung Exam:  normal  Heart Exam:  normal  Airway Exam: normal  Previous reaction to anesthesia/sedation:   No  Sedation plan based on assessment: Moderate (conscious) sedation  ASA Classification:  1 - Healthy patient, no medical problems        IMPRESSION:  melena    PLAN:  EGD    Rachel Birmingham  [facility]  Office: [phone]

## 2018-07-25 NOTE — ED NOTES
[facility]  ED Nurse Handoff Report    Kayden Schmitz is a 48 year old male   ED Chief complaint: Melena  . ED Diagnosis:   Final diagnoses:   Gastrointestinal hemorrhage, unspecified gastrointestinal hemorrhage type     Allergies:   Allergies   Allergen Reactions     Fish      Throat swells     Lisinopril      No Known Allergies        Code Status: Full Code  Activity level - Baseline/Home:  Independent. Activity Level - Current:   Independent. Lift room needed: No. Bariatric: No   Needed: No   Isolation: Yes. Infection: C-Diff Pending.     Vital Signs:   Vitals:    07/24/18 2245 07/25/18 0015   BP: 109/77 111/73   Pulse: 96 86   Resp: 14    Temp: 97.9  F (36.6  C)    TempSrc: Oral    SpO2: 100%        Cardiac Rhythm:  ,      Pain level:    Patient confused: No. Patient Falls Risk: No.   Elimination Status: Has voided   Patient Report - Initial Complaint: Melena. Focused Assessment: Pt unable to provide stool sample in ED. C Diff dx earlier in summer--on isolation precautions. C/o dizziness with standing but otherwise no complaints   Tests Performed: labs, CT abd. Abnormal Results: Hgb low.   Treatments provided: protonix  Family Comments: wife bedside, very pleasant   OBS brochure/video discussed/provided to patient:  N/A  ED Medications:   Medications   ondansetron (ZOFRAN) injection 4 mg (not administered)   pantoprazole (PROTONIX) 40 mg IV push injection (not administered)   0.9% sodium chloride BOLUS (0 mLs Intravenous Stopped 7/25/18 0043)   iopamidol (ISOVUE-370) solution 500 mL (100 mLs Intravenous Given 7/25/18 0039)     Drips infusing:  No  For the majority of the shift, the patient's behavior Green. Interventions performed were .     Severe Sepsis OR Septic Shock Diagnosis Present: No      ED Nurse Name/Phone Number: Hillary Mckeejuan [email or web address],   1:16 AM  RECEIVING UNIT ED HANDOFF REVIEW    Above ED Nurse Handoff Report was reviewed: Yes  Reviewed by: Rita Badillo on July 25, 2018  at 2:02 AM

## 2018-07-25 NOTE — PROGRESS NOTES
Patient was admitted by my colleague earlier this morning.  He does not have chronic medical problems although has history of gastric bypass surgery and has been perhaps using excessive alcohol recently.  He presented to the emergency department for evaluation of black stools.  He also had some red blood in stools.  Labs showed modest anemia with hemoglobin of 11.5.  He was seen by gastroenterology who performed upper endoscopy.  This showed a small ulcer at the GJ junction.  Proton pump inhibitor and avoidance of nonsteroidal anti-inflammatory drugs was recommended.  Repeat EGD in 3 months was also recommended.  Stool was tested for C. difficile which came back positive.  Vancomycin was started.  Patient will be seen tomorrow with full note

## 2018-07-25 NOTE — DISCHARGE SUMMARY
"Discharge Summary    Kayden Schmitz MRN# [id number]   YOB: 1970 Age: 48 year old     Date of Admission:  7/24/2018  Date of Discharge:  7/25/2018  Admitting Physician:  Miles Smith MD  Discharge Physician:  Buzz Summers MD  Discharging Service:  Hospitalist     Home clinic:  [facility], [place]          Discharge Diagnosis:   1.  Upper GI bleed due to ulcer at GJ junction of previous gastric bypass surgery.  2.  C diff colitis.  3.  History of gastric bypass surgery.             Discharge Disposition:   Discharged to home           Allergies:   Allergies   Allergen Reactions     Fish      Throat swells     Lisinopril      No Known Allergies                 Condition on Discharge:   Discharge condition: Stable   Discharge vitals: Blood pressure 135/74, pulse 83, temperature 97.7  F (36.5  C), temperature source Oral, resp. rate 16, height 1.702 m (5' 7\"), weight 89.8 kg (197 lb 14.4 oz), SpO2 99 %.   Code status on discharge: Full Code            History of Present Illness and Hospital Course:     Patient is a 48 year old male with history of gastric bypass.  He does not have chronic medical problems.  He presented to the ED on 7/24/18 for evaluation of black stools.  He had mild anemia with HGB as low as 11.5.  He had been using some NSAIDs on occasion.  He also had had some diarrhea.  He was seen by GI and had EGD which showed an ulcer at the GJ junction.  Daily PPI, cessation of NSAIDs, and repeat EGD in 3 months with [facility] was recommended.  Testing for c diff was positive so oral Vancomycin was started.  Kayden felt well after his procedure and tolerated a regular diet.  He inquired about discharge home.  I recommended that he stay another day for monitoring of hemoglobin and tolerance of oral Vancomycin but Kayden preferred to discharge.             Procedures / Imaging:   EGD           Consultations:   Consultation during this admission received from gastroenterology "             Pending Results:   None            Discharge Instructions and Follow-Up:   Discharge diet: Regular   Discharge activity: Activity as tolerated   Discharge follow-up: Follow up with primary care provider in 1 week with CBC   Outpatient therapy: None    Other instructions: No NSAIDs (ibuprofen, alleve, aspirin, celebrex,or orudis)             Discharge Medications:   Current Discharge Medication List      START taking these medications    Details   vancomycin (FIRVANQ) 50 MG/ML SOLR Take 2.5 mLs (125 mg) by mouth 4 times daily for 10 days  Qty: 100 mL, Refills: 0    Comments: May substitute tablets of capsules for liquid if needed  Associated Diagnoses: C. difficile colitis         CONTINUE these medications which have NOT CHANGED    Details   acetaminophen (TYLENOL) 500 MG tablet Take 500 mg by mouth every 6 hours as needed for mild pain Max dose is 4000 mg per day      albuterol (PROAIR HFA/PROVENTIL HFA/VENTOLIN HFA) 108 (90 Base) MCG/ACT Inhaler Inhale 1-2 puffs into the lungs every 4 hours as needed for wheezing      Ascorbic Acid (VITAMIN C) 500 MG CHEW Take 1 tablet by mouth daily Take with iron tablets      calcium citrate-vitamin D (CITRACAL) 315-250 MG-UNIT TABS per tablet Take 1 tablet by mouth daily      cetirizine (ZYRTEC ALLERGY) 10 MG tablet Take 10 mg by mouth daily      Cholecalciferol (VITAMIN D3) 2000 units TABS Take 1 tablet by mouth daily      cyanocobalamin 1000 MCG SUBL sublingual tablet Place 1,000 mcg under the tongue once a week       ferrous sulfate (IRON) 325 (65 Fe) MG tablet Take 325 mg by mouth 2 times daily       fexofenadine (ALLEGRA) 180 MG tablet Take 180 mg by mouth daily as needed for allergies      HYDROcodone-acetaminophen (NORCO) 5-325 MG per tablet Take 1 tablet by mouth every 4 hours as needed for moderate to severe pain  Qty: 10 tablet, Refills: 0      MEDROL (KAREN) 4 MG OR TABS AS DIRECTED  Qty: 21, Refills: 0    Associated Diagnoses: Cough      MULTIVITAMINS OR  TABS Take one tablet by mouth once daily  Qty: 100, Refills: 1 YEAR    Associated Diagnoses: Routine general medical examination at a health care facility      omeprazole 20 MG tablet Take 20 mg by mouth daily      sildenafil (VIAGRA) 100 MG tablet Take  mg by mouth daily as needed         STOP taking these medications       ibuprofen (ADVIL/MOTRIN) 200 MG tablet Comments:   Reason for Stopping:                  Total time spent in face to face contact with the patient and coordinating discharge was:  20 Minutes

## 2018-07-25 NOTE — PLAN OF CARE
Problem: Patient Care Overview  Goal: Plan of Care/Patient Progress Review  Outcome: No Change  Vital signs stable.  Denies any abdominal pain.  Norco given for back pain.  Melatonin given for sleep.  Plan is to monitor for bloody stools, hgb checks and a GI consult today.  Continue with POC.

## 2018-07-25 NOTE — ED NOTES
"Orthostatics:     Lying:   HR 78   /85  Sittin         111/73  Standin        103/74    C/o \"mild\" dizziness with standing    MD aware     "

## 2018-07-25 NOTE — CONSULTS
GASTROENTEROLOGY CONSULTATION      Kayden Schmitz  [address]  [facility] [address]  48 year old male     Admission Date/Time: 7/24/2018  Primary Care Provider: [facility], [place]  Referring / Attending Physician: Dr. Smith     We were asked to see the patient in consultation by Dr. Smith for evaluation of melena.        HPI:  Kayden Schmitz is a 48 year old male with medical history of gastric bypass surgery, Sathya-en-Y, who presents to the hospital with lightheadedness and dizziness reporting dark stool.    Patient reports that he passed dark black stool approximately 2 days ago.  The first occasion he thought it was related to food and dismissed this symptom. However the had another dark black stool yesterday. He was lightheaded while working in the yard as well. He was nauseated without vomiting. No abdominal pain. Additionally he admits to drinking alcohol heavily prior to the start of black stool. He estimates 8 shots of hard alcohol the say before he noticed black stool. He does use iron supplements daily but there was a clear change from baseline in color.     In June he was diagnosed and treated for C.difficile diarrhea with antibiotics and seemingly improved. Repeat C.diff is pending. Patient did have a colonoscopy in 2012 prior to bariatric surgery.  This was completed through the [facility] system.  Findings include four tubular adenomas  scattered throughout his colon.  It appears that he was to have a repeat colonoscopy in 2017.         PAST MEDICAL HISTORY:  Patient Active Problem List    Diagnosis Date Noted     Acute gastrointestinal hemorrhage 07/25/2018     Priority: Medium          ROS: A comprehensive ten point review of systems was negative aside from those in mentioned in the HPI.       MEDICATIONS:   Prior to Admission medications    Medication Sig Start Date End Date Taking? Authorizing Provider   acetaminophen (TYLENOL) 500 MG tablet Take 500 mg by  mouth every 6 hours as needed for mild pain Max dose is 4000 mg per day   Yes Unknown, Entered By History   albuterol (PROAIR HFA/PROVENTIL HFA/VENTOLIN HFA) 108 (90 Base) MCG/ACT Inhaler Inhale 1-2 puffs into the lungs every 4 hours as needed for wheezing   Yes Unknown, Entered By History   Ascorbic Acid (VITAMIN C) 500 MG CHEW Take 1 tablet by mouth daily Take with iron tablets   Yes Unknown, Entered By History   calcium citrate-vitamin D (CITRACAL) 315-250 MG-UNIT TABS per tablet Take 1 tablet by mouth daily   Yes Unknown, Entered By History   cetirizine (ZYRTEC ALLERGY) 10 MG tablet Take 10 mg by mouth daily   Yes Unknown, Entered By History   Cholecalciferol (VITAMIN D3) 2000 units TABS Take 1 tablet by mouth daily   Yes Unknown, Entered By History   cyanocobalamin 1000 MCG SUBL sublingual tablet Place 1,000 mcg under the tongue once a week    Yes Unknown, Entered By History   ferrous sulfate (IRON) 325 (65 Fe) MG tablet Take 325 mg by mouth 2 times daily    Yes Unknown, Entered By History   fexofenadine (ALLEGRA) 180 MG tablet Take 180 mg by mouth daily as needed for allergies   Yes Unknown, Entered By History   HYDROcodone-acetaminophen (NORCO) 5-325 MG per tablet Take 1 tablet by mouth every 4 hours as needed for moderate to severe pain 8/20/17  Yes Sangita Askew MD   ibuprofen (ADVIL/MOTRIN) 200 MG tablet Take 200-400 mg by mouth every 4 hours as needed for mild pain   Yes Unknown, Entered By History   MEDROL (KAREN) 4 MG OR TABS AS DIRECTED 4/10/07  Yes Huong Gonzales MD   MULTIVITAMINS OR TABS Take one tablet by mouth once daily 2/6/07  Yes Hank Obando MD   omeprazole 20 MG tablet Take 20 mg by mouth daily   Yes Unknown, Entered By History   sildenafil (VIAGRA) 100 MG tablet Take  mg by mouth daily as needed   Yes Unknown, Entered By History        ALLERGIES:   Allergies   Allergen Reactions     Fish      Throat swells     Lisinopril      No Known Allergies         SOCIAL  "HISTORY:  Social History   Substance Use Topics     Smoking status: Former Smoker     Quit date: 2002     Smokeless tobacco: Not on file      Comment: QUIT      Alcohol use Yes      Comment: SELDOM; perhaps 1 drink monthly        FAMILY HISTORY:  Family History   Problem Relation Age of Onset     Neurologic Disorder Father      ALS,  AGE 62     Family History Negative Mother         PHYSICAL EXAM:     /80 (BP Location: Left arm)  Pulse 86  Temp 97.6  F (36.4  C) (Oral)  Resp 16  Ht 1.702 m (5' 7\")  Wt 89.8 kg (197 lb 14.4 oz)  SpO2 99%  BMI 31 kg/m2     PHYSICAL EXAM:  GENERAL:  NAD  SKIN: no suspicious lesions, rashes, jaundice  HEAD: Normocephalic. Atraumatic.  NECK: Neck supple. No adenopathy.   EYES: No scleral icterus  RESPIRATORY: Good transmission. CTA bilaterally.   CARDIOVASCULAR: RRR, normal S1, S2,  No murmur appreciated  GASTROINTESTINAL: +BS, soft, non tender, non distended, no guarding/rebound  JOINT/EXTREMITIES:  no gross deformities noted, normal muscle tone  NEURO: CN 2-12 grossly intact, no focal deficits  PSYCH: Normal affect        ADDITIONAL COMMENTS:   I reviewed the patient's new clinical lab test results.   Recent Labs   Lab Test  18   0629  18   2352   WBC   --   11.8*   HGB  11.5*  12.2*   MCV   --   88   PLT   --   306   INR   --   1.26*     Recent Labs   Lab Test  18   2352   POTASSIUM  4.0   CHLORIDE  103   CO2  29   BUN  25   ANIONGAP  4     Recent Labs   Lab Test  18   2352   ALBUMIN  3.4   BILITOTAL  0.6   ALT  27   AST  17   LIPASE  95        IMAGING / ENDOSCOPY    CT ABDOMEN AND PELVIS WITH CONTRAST  2018 12:44 AM   FINDINGS:  Abdomen: The liver, spleen, pancreas, adrenal glands and kidneys are  unremarkable. The gallbladder is present. Prior gastric bypass  surgery. No enlarged lymph nodes or free fluid in the upper abdomen.     Scan through the lower chest is unremarkable.     Pelvis: The small and large bowel are normal in " caliber. The appendix  is unremarkable. No bowel wall thickening, pneumatosis or free  intraperitoneal gas. Mild enlargement of the prostate gland. No  enlarged lymph nodes or free fluid in the pelvis.         IMPRESSION: No cause of acute pain identified in the abdomen or  pelvis.     CONSULTATION ASSESSMENT AND PLAN:    Kayden Schmitz is a 48 year old with medical history of gastric bypass surgery, Sathya-en-Y, who presents to the hospital with lightheadedness and dizziness reporting dark stool.    1.  Melena: With subsequent to 2 g drop in hemoglobin from baseline.  The patient has no abdominal pain.  He passed a dark black stool yesterday.  Suspicion for alcoholic gastritis or esophagitis.  However cannot rule out an ulcer.  He has not been using omeprazole regularly at home, only taking it as needed.  He does use Excedrin on an as-needed basis as well.  No history of liver disease.    --We will plan for upper endoscopy today.  Continue PPI.      I discussed the patient plan with Dr. Birmingham. Thank you for asking us to participate in the care of this patient.    Yolanda Coulter PA-C  [facility]    -----------------------------  I agree with the assessment and plan of Yolanda Coulter.  He uses excedrin a couple of times per week.  Having black stools and also reports some red blood too.  Just diagnosed with C.diff.  On exam abd is benign.  Proceed with EGD.    Murphy Birmingham MD  [facility]

## 2018-07-25 NOTE — H&P
[facility]  Hospitalist Admission Note  July 25, 2018  Name: Kayden Schmitz    MRN: [id number]  YOB: 1970    Age: 48 year old  Date of admission: 7/24/2018  Primary care provider: [facility], [place]      Summary:  Patient is a  48-year-old gentleman with a history significant only for gastric bypass surgery, role in why I believe, who only takes a multivitamin and iron supplements who presents here with 2 melenic stools.    Problem list/Plan:  1. Melenic stools: Concerning for upper GI bleed.  With recent heavy alcohol use, may consider gastritis.  Cannot rule out ulcer or anastomotic bleed from previous bypass surgery.  At this time, will make patient n.p.o. Start patient maintenance IV fluids and continue IV PPI twice daily.  Serial hemoglobins and will request a GI consultation to discuss the possibility of an endoscopy.  2. Loose stools: With recent travel and recurrent symptoms, will order an enteric panel along with O&P.  Unfortunately, epic will not let me order to C. difficile panel despite answering appropriate questions.  Tried on 3 occasions.  3. Notable history of gastric bypass surgery, Sathya-en-Y.      -Additional workup plan which will include GI consultation    All lab work and imaging data independently reviewed by myself    Prophylaxis;  Low risk/Ambulation.     Code status: Full code    Discharge: Home when able    Chief Complaint:   Abdominal discomfort with melenic stools   HPI  Patient is a  48-year-old gentleman with a history significant only for gastric bypass surgery, Sathya-en-Y I believe, who only takes a multivitamin and iron supplements who presents here with 2 melenic stools.  He reports that about a month ago, [place] was [place].  He did admit that he ate some chicken which follow-up for.  When he returned home, he developed some dizziness along with intermittent abdominal cramping with nausea, vomiting, and  "diarrhea.  He went to see his primary MD who did some stool studies and came back positive for C. difficile. He actually completed a course of antibiotics for that and has felt better.  Yesterday morning, he had 2 bouts of dark stools which is unusual for him despite being on iron supplements.  He was feeling ok until he went out to do some yard work when he felt very fatigued, weak, and dizzy.  He reported that he was very diaphoretic despite light work. He did report some associated nausea but no hematemesis.  Thereafter, he reported having significant loose stools which he describes as \"dark diarrhea mixed with blood\".  Thus, the patient came in for further evaluation.  On presentation, he was hemodynamically stable but hemoglobin was notably low at 12.2.  Back on 6/25/18 on review of records from [facility] everywhere, his hemoglobin was 16.  There is no signs of active persistent bleeding and patient feels a bit better after some IV fluid resuscitation in the ED.  Patient will be admitted for further GI workup given concerns for possible upper GI bleed.  On a side note, patient did report to nursing staff that he did drink quite a bit of alcohol on Sunday and wonder if that has something to do with his symptoms.  He denies any history of pud or gi bleeding.  I did discuss the case in detail with the ED physician.     Past Medical History:     Past Medical History:   Diagnosis Date     Obesity, unspecified    Gastric bypass surgery  Status post cholecystectomy  Past Surgical History:     Past Surgical History:   Procedure Laterality Date     HC EXCISION PILONIDAL LESION SIMPLE  1989    pilonidal cyst surg      REPAIR ING HERNIA,5+Y/O,REDUCIBL  11/20/03    right     Social History:     Social History   Substance Use Topics     Smoking status: Former Smoker     Quit date: 1/1/2002     Smokeless tobacco: Not on file      Comment: QUIT 2002     Alcohol use Yes      Comment: SELDOM; perhaps 1 drink monthly " "    Family History:  Family history reviewed. NO pertinent family history     Allergies:     Allergies   Allergen Reactions     Fish      Throat swells     Lisinopril      No Known Allergies      Medications:     Prescriptions Prior to Admission   Medication Sig Dispense Refill Last Dose     diphenhydrAMINE (BENADRYL) 25 MG tablet Take 2 tablets (50 mg) by mouth every 6 hours as needed for itching 56 tablet 0      EPINEPHrine (EPIPEN) 0.3 MG/0.3ML injection Inject 0.3 mLs (0.3 mg) into the muscle once as needed for anaphylaxis 2 each 0      HYDROcodone-acetaminophen (NORCO) 5-325 MG per tablet Take 1 tablet by mouth every 4 hours as needed for moderate to severe pain 10 tablet 0      MEDROL (KAREN) 4 MG OR TABS AS DIRECTED 21 0      MULTIVITAMINS OR TABS ONE DAILY 100 1 YEAR      predniSONE (DELTASONE) 20 MG tablet Take three tablets (= 60mg) each day for three days 9 tablet 0      ROBITUSSIN A-C  MG/5ML OR SYRP ONE TO TWO TEASPOONS EVER 4 HOURS, AS NEEDED FOR COUGH 8 OZ 0      ZITHROMAX Z-KAREN 250 MG OR CAPS 2 CAPSULES TODAY, THEN 1 CAPSULE DAILY 6 0        Review of Systems:   A Comprehensive greater than 10 system review of systems was carried out.  Pertinent positives and negatives are noted above.  Otherwise negative for contributory information.        Physical Exam:  Blood pressure 115/70, pulse 86, temperature 98  F (36.7  C), temperature source Oral, resp. rate 16, height 1.702 m (5' 7\"), weight 89.8 kg (197 lb 14.4 oz), SpO2 98 %.  Gen: Pleasant in no acute distress.  HEENT: NCAT. EOMI. PERRL.  Neck: Normal inspection. No bruit, JVD or thyromegaly.  Lungs: Normal respiratory effort. Clear to auscultation bilaterally with no crackles or wheezes.  Card: N s1s2. RRR. No M/R/G.  Peripheral pulses present and symmetric.   Abd: Soft NT ND. No mass. Normal bowel sounds.  Skin: No rash. Warm to the touch  Extr: No edema. CMS intact  Psychiatric: Patient alert oriented ×3.  Normal affect  Neurologic: Cranial " nerves II-XII are intact.  Sensation normal.  Motor strength 5/5    Data:       Recent Labs  Lab 07/24/18  2352   WBC 11.8*   HGB 12.2*   HCT 36.8*   MCV 88          Recent Labs  Lab 07/24/18  2352      POTASSIUM 4.0   CHLORIDE 103   CO2 29   ANIONGAP 4   *   BUN 25   CR 0.80   GFRESTIMATED >90   GFRESTBLACK >90   HEATHER 8.3*   PROTTOTAL 6.9   ALBUMIN 3.4   BILITOTAL 0.6   ALKPHOS 72   AST 17   ALT 27       Recent Labs  Lab 07/24/18  2352   INR 1.26*       Imaging:   Recent Results (from the past 24 hour(s))   CT Abdomen Pelvis w Contrast    Narrative    CT ABDOMEN AND PELVIS WITH CONTRAST  7/25/2018 12:44 AM     HISTORY: Abdominal pain. Bloody diarrhea.    COMPARISON: None.    TECHNIQUE: Following the uneventful administration of 100 mL  Isovue-370 intravenous contrast, helical sections were acquired from  the top of the diaphragm through the pubic symphysis. Coronal  reconstructions were generated. Radiation dose for this scan was  reduced using automated exposure control, adjustment of the mA and/or  kV according to the patient's size, or iterative reconstruction  technique.    FINDINGS:  Abdomen: The liver, spleen, pancreas, adrenal glands and kidneys are  unremarkable. The gallbladder is present. Prior gastric bypass  surgery. No enlarged lymph nodes or free fluid in the upper abdomen.    Scan through the lower chest is unremarkable.    Pelvis: The small and large bowel are normal in caliber. The appendix  is unremarkable. No bowel wall thickening, pneumatosis or free  intraperitoneal gas. Mild enlargement of the prostate gland. No  enlarged lymph nodes or free fluid in the pelvis.      Impression    IMPRESSION: No cause of acute pain identified in the abdomen or  pelvis.    MD Miles FINNEGAN MD Pager [phone]

## 2018-07-25 NOTE — PHARMACY-ADMISSION MEDICATION HISTORY
Admission medication history interview status for this patient is complete. See [facility] admission navigator for allergy information, prior to admission medications and immunization status.     Medication history interview source(s):Patient  Medication history resources (including written lists, pill bottles, clinic record):None  [facility]    Changes made to PTA medication list:  Added: Zyrtec, Allegra, Vitamin D3, Vitamin B12, Vitamin C, Ferrous Sulfate, Calcitrate, Omeprazole, Ibuprofen, Albuterol inhaler, Viagra, Acetaminophen  Deleted: Benadryl, Epipen, Prenisone, Robitussin A-C, Zithromax  Changed: None    Actions taken by pharmacist (provider contacted, etc):None     Additional medication history information:None    Medication reconciliation/reorder completed by provider prior to medication history? No    Do you take OTC medications (eg tylenol, ibuprofen, fish oil, eye/ear drops, etc)? Y (Y/N)    For patients on insulin therapy: N (Y/N)    Prior to Admission medications    Medication Sig Last Dose Taking? Auth Provider   acetaminophen (TYLENOL) 500 MG tablet Take 500 mg by mouth every 6 hours as needed for mild pain Max dose is 4000 mg per day 7/24/2018 at 10am Yes Unknown, Entered By History   albuterol (PROAIR HFA/PROVENTIL HFA/VENTOLIN HFA) 108 (90 Base) MCG/ACT Inhaler Inhale 1-2 puffs into the lungs every 4 hours as needed for wheezing  at prn Yes Unknown, Entered By History   Ascorbic Acid (VITAMIN C) 500 MG CHEW Take 1 tablet by mouth daily Take with iron tablets couple days ago Yes Unknown, Entered By History   calcium citrate-vitamin D (CITRACAL) 315-250 MG-UNIT TABS per tablet Take 1 tablet by mouth daily couple days ago Yes Unknown, Entered By History   cetirizine (ZYRTEC ALLERGY) 10 MG tablet Take 10 mg by mouth daily 7/24/2018 at Unknown time Yes Unknown, Entered By History   Cholecalciferol (VITAMIN D3) 2000 units TABS Take 1 tablet by mouth daily 7/24/2018 at 10am Yes  Unknown, Entered By History   cyanocobalamin 1000 MCG SUBL sublingual tablet Place 1,000 mcg under the tongue once weekly couple days ago Yes Unknown, Entered By History   ferrous sulfate (IRON) 325 (65 Fe) MG tablet Take 325 mg by mouth twice daily with meals 7/24/2018 at 10am Yes Unknown, Entered By History   fexofenadine (ALLEGRA) 180 MG tablet Take 180 mg by mouth daily as needed for allergies 7/24/2018 at Unknown time Yes Unknown, Entered By History   HYDROcodone-acetaminophen (NORCO) 5-325 MG per tablet Take 1 tablet by mouth every 4 hours as needed for moderate to severe pain 7/24/2018 at evening Yes Sangita Askew MD   ibuprofen (ADVIL/MOTRIN) 200 MG tablet Take 200-400 mg by mouth every 4 hours as needed for mild pain 7/21/2018 Yes Unknown, Entered By History   MEDROL (KAREN) 4 MG OR TABS AS DIRECTED 7/24/2018 at evening Yes Huong Gonzales MD   MULTIVITAMINS OR TABS Take one tablet by mouth once daily 7/24/2018 at 10am Yes Hank Obando MD   omeprazole 20 MG tablet Take 20 mg by mouth daily Past Week at Unknown time Yes Unknown, Entered By History   sildenafil (VIAGRA) 100 MG tablet Take  mg by mouth daily as needed 7/23/2018 Yes Unknown, Entered By History

## 2018-07-26 LAB
COPATH REPORT: NORMAL
O+P STL MICRO: NORMAL
O+P STL MICRO: NORMAL
SPECIMEN SOURCE: NORMAL

## 2018-07-26 NOTE — PLAN OF CARE
Problem: Patient Care Overview  Goal: Plan of Care/Patient Progress Review  Outcome: Adequate for Discharge Date Met: 07/25/18  Discharge instructions reviewed with patient and spouse, stated good understanding of all information including antibiotic and follow up with MDs as ordered.  PIV removed.  All belongings with patient at discharge.  Discharging home in c/o wife.

## 2020-12-01 NOTE — IP AVS SNAPSHOT
MRN:[id number]                      After Visit Summary   7/24/2018    Kayden Schmitz    MRN: [id number]           Thank you!     Thank you for choosing [facility] for your care. Our goal is always to provide you with excellent care. Hearing back from our patients is one way we can continue to improve our services. Please take a few minutes to complete the written survey that you may receive in the mail after you visit. If you would like to speak to someone directly about your visit please contact Patient Relations at [phone]. Thank you!          Patient Information     Date Of Birth          1970        Designated Caregiver       Most Recent Value    Caregiver    Will someone help with your care after discharge? yes    Name of designated caregiver Alize Schmitz    Phone number of caregiver [phone]    Caregiver address same as on file      About your hospital stay     You were admitted on:  July 25, 2018 You last received care in the:  [place] [address] [facility]    You were discharged on:  July 25, 2018        Reason for your hospital stay       Upper GI bleed due to anastomotic ulcer.  C diff colitis was found during admission.                  Who to Call     For medical emergencies, please call 911.  For non-urgent questions about your medical care, please call your primary care provider or clinic, [phone]  For questions related to your surgery, please call your surgery clinic        Attending Provider     Provider Specialty    Jama Hope MD Emergency Medicine    Miles Smith MD Internal Medicine       Primary Care Provider Office Phone # Fax #    [facility] [phone]      After Care Instructions     Activity       Your activity upon discharge: activity as tolerated            Diet       Follow this diet upon discharge:       Regular Diet Adult                  Follow-up Appointments     Follow-up and  Please see below My Chart message and advise.     "recommended labs and tests        Follow up with PCP in less than one week with a CBC  Follow up with Dr. Birmingham of [facility] in 3 months for repeat EGD (her office will contact you)  No NSAID's (ibuprofen, alleve, aspirin, orudis, celebrex, etc.)                  Further instructions from your care team       The patient has received a copy of the Provation  report the doctor has written and discharge instructions have been discussed with the patient and responsible adult.  All questions were addressed and answered prior to patient discharge.    Pending Results     Date and Time Order Name Status Description    7/25/2018 1403 Surgical pathology exam In process     7/24/2018 2330 Ova and Parasite Exam Routine In process             Statement of Approval     Ordered          07/25/18 6654  I have reviewed and agree with all the recommendations and orders detailed in this document.  EFFECTIVE NOW     Approved and electronically signed by:  Buzz Summers MD             Admission Information     Date & Time Department Dept. Phone    7/24/2018 [place] [address] [facility] [phone]      Your Vitals Were     Blood Pressure Pulse Temperature Respirations Height Weight    135/74 (BP Location: Left arm) 83 97.7  F (36.5  C) (Oral) 16 1.702 m (5' 7\") 89.8 kg (197 lb 14.4 oz)    Pulse Oximetry BMI (Body Mass Index)                99% 31 kg/m2          MyChart Information     [employer] lets you send messages to your doctor, view your test results, renew your prescriptions, schedule appointments and more. To sign up, go to [email or web address] . Click on \"Log in\" on the left side of the screen, which will take you to the Welcome page. Then click on \"Sign up Now\" on the right side of the page.     You will be asked to enter the access code listed below, as well as some personal information. Please follow the directions to create your username and password.     Your access code is: [email or web address]  Expires: 10/23/2018  6:50 " PM     Your access code will  in 90 days. If you need help or a new code, please call your [place] clinic or [phone].        Care EveryWhere ID     This is your Care EveryWhere ID. This could be used by other organizations to access your [place] medical records  [id number]        Equal Access to Services     IDALMISADRIANA KAYCE : Hadii taina wilkinson hadasho Soomaali, waaxda luqadaha, qaybta kaalmada bingarnoldda, corey charlesjosianeartie canales . So [facility] [phone].    ATENCIÓN: Si habla español, tiene a brandt disposición servicios gratuitos de asistencia lingüística. Llame al [phone].    We comply with applicable federal civil rights laws and [place] laws. We do not discriminate on the basis of race, color, national origin, age, disability, sex, sexual orientation, or gender identity.               Review of your medicines      START taking        Dose / Directions    vancomycin 50 MG/ML Solr   Commonly known as:  FIRVANQ   Indication:  Clostridium difficile Bacteria        Dose:  125 mg   Take 2.5 mLs (125 mg) by mouth 4 times daily for 10 days   Quantity:  100 mL   Refills:  0         CONTINUE these medicines which have NOT CHANGED        Dose / Directions    acetaminophen 500 MG tablet   Commonly known as:  TYLENOL        Dose:  500 mg   Take 500 mg by mouth every 6 hours as needed for mild pain Max dose is 4000 mg per day   Refills:  0       albuterol 108 (90 Base) MCG/ACT Inhaler   Commonly known as:  PROAIR HFA/PROVENTIL HFA/VENTOLIN HFA        Dose:  1-2 puff   Inhale 1-2 puffs into the lungs every 4 hours as needed for wheezing   Refills:  0       calcium citrate-vitamin D 315-250 MG-UNIT Tabs per tablet   Commonly known as:  CITRACAL        Dose:  1 tablet   Take 1 tablet by mouth daily   Refills:  0       cyanocobalamin 1000 MCG Subl sublingual tablet        Dose:  1000 mcg   Place 1,000 mcg under the tongue once a week   Refills:  0       ferrous sulfate 325 (65 Fe) MG tablet   Commonly  known as:  IRON        Dose:  325 mg   Take 325 mg by mouth 2 times daily   Refills:  0       fexofenadine 180 MG tablet   Commonly known as:  ALLEGRA        Dose:  180 mg   Take 180 mg by mouth daily as needed for allergies   Refills:  0       HYDROcodone-acetaminophen 5-325 MG per tablet   Commonly known as:  NORCO        Dose:  1 tablet   Take 1 tablet by mouth every 4 hours as needed for moderate to severe pain   Quantity:  10 tablet   Refills:  0       MEDROL (KAREN) 4 MG tablet   Used for:  Cough   Generic drug:  methylPREDNISolone        AS DIRECTED   Quantity:  21   Refills:  0       multivitamin per tablet   Used for:  Routine general medical examination at a health care facility        Take one tablet by mouth once daily   Quantity:  100   Refills:  1 YEAR       omeprazole 20 MG tablet        Dose:  20 mg   Take 20 mg by mouth daily   Refills:  0       VIAGRA 100 MG tablet   Generic drug:  sildenafil        Dose:   mg   Take  mg by mouth daily as needed   Refills:  0       vitamin C 500 MG Chew        Dose:  1 tablet   Take 1 tablet by mouth daily Take with iron tablets   Refills:  0       Vitamin D3 2000 units Tabs        Dose:  1 tablet   Take 1 tablet by mouth daily   Refills:  0       ZYRTEC ALLERGY 10 MG tablet   Generic drug:  cetirizine        Dose:  10 mg   Take 10 mg by mouth daily   Refills:  0         STOP taking     ibuprofen 200 MG tablet   Commonly known as:  ADVIL/MOTRIN                Where to get your medicines      These medications were sent to [facility]  [place], [place] - [address] [facility]  [address] [place] [place] [address]     Phone:  [phone]     vancomycin 50 MG/ML Solr                Protect others around you: Learn how to safely use, store and throw away your medicines at www.disposemymeds.org.        ANTIBIOTIC INSTRUCTION     You've Been Prescribed an Antibiotic - Now What?  Your healthcare team thinks that you or your loved one  might have an infection. Some infections can be treated with antibiotics, which are powerful, life-saving drugs. Like all medications, antibiotics have side effects and should only be used when necessary. There are some important things you should know about your antibiotic treatment.      Your healthcare team may run tests before you start taking an antibiotic.    Your team may take samples (e.g., from your blood, urine or other areas) to run tests to look for bacteria. These test can be important to determine if you need an antibiotic at all and, if you do, which antibiotic will work best.      Within a few days, your healthcare team might change or even stop your antibiotic.    Your team may start you on an antibiotic while they are working to find out what is making you sick.    Your team might change your antibiotic because test results show that a different antibiotic would be better to treat your infection.    In some cases, once your team has more information, they learn that you do not need an antibiotic at all. They may find out that you don't have an infection, or that the antibiotic you're taking won't work against your infection. For example, an infection caused by a virus can't be treated with antibiotics. Staying on an antibiotic when you don't need it is more likely to be harmful than helpful.      You may experience side effects from your antibiotic.    Like all medications, antibiotics have side effects. Some of these can be serious.    Let you healthcare team know if you have any known allergies when you are admitted to the hospital.    One significant side effect of nearly all antibiotics is the risk of severe and sometimes deadly diarrhea caused by Clostridium difficile (C. Difficile). This occurs when a person takes antibiotics because some good germs are destroyed. Antibiotic use allows C. diificile to take over, putting patients at high risk for this serious infection.    As a patient or  caregiver, it is important to understand your or your loved one's antibiotic treatment. It is especially important for caregivers to speak up when patients can't speak for themselves. Here are some important questions to ask your healthcare team.    What infection is this antibiotic treating and how do you know I have that infection?    What side effects might occur from this antibiotic?    How long will I need to take this antibiotic?    Is it safe to take this antibiotic with other medications or supplements (e.g., vitamins) that I am taking?     Are there any special directions I need to know about taking this antibiotic? For example, should I take it with food?    How will I be monitored to know whether my infection is responding to the antibiotic?    What tests may help to make sure the right antibiotic is prescribed for me?      Information provided by:  www.cdc.gov/getsmart  U.S. Department of Health and Human Services  Centers for disease Control and Prevention  National Center for Emerging and Zoonotic Infectious Diseases  Division of Healthcare Quality Promotion             Medication List: This is a list of all your medications and when to take them. Check marks below indicate your daily home schedule. Keep this list as a reference.      Medications           Morning Afternoon Evening Bedtime As Needed    acetaminophen 500 MG tablet   Commonly known as:  TYLENOL   Take 500 mg by mouth every 6 hours as needed for mild pain Max dose is 4000 mg per day                                albuterol 108 (90 Base) MCG/ACT Inhaler   Commonly known as:  PROAIR HFA/PROVENTIL HFA/VENTOLIN HFA   Inhale 1-2 puffs into the lungs every 4 hours as needed for wheezing                                calcium citrate-vitamin D 315-250 MG-UNIT Tabs per tablet   Commonly known as:  CITRACAL   Take 1 tablet by mouth daily                                cyanocobalamin 1000 MCG Subl sublingual tablet   Place 1,000 mcg under the  tongue once a week                                ferrous sulfate 325 (65 Fe) MG tablet   Commonly known as:  IRON   Take 325 mg by mouth 2 times daily                                fexofenadine 180 MG tablet   Commonly known as:  ALLEGRA   Take 180 mg by mouth daily as needed for allergies                                HYDROcodone-acetaminophen 5-325 MG per tablet   Commonly known as:  NORCO   Take 1 tablet by mouth every 4 hours as needed for moderate to severe pain   Last time this was given:  1 tablet on 7/25/2018  3:25 AM                                MEDROL (KAREN) 4 MG tablet   AS DIRECTED   Generic drug:  methylPREDNISolone                                multivitamin per tablet   Take one tablet by mouth once daily                                omeprazole 20 MG tablet   Take 20 mg by mouth daily                                vancomycin 50 MG/ML Solr   Commonly known as:  FIRVANQ   Take 2.5 mLs (125 mg) by mouth 4 times daily for 10 days   Last time this was given:  125 mg on 7/25/2018  6:07 PM                                VIAGRA 100 MG tablet   Take  mg by mouth daily as needed   Generic drug:  sildenafil                                vitamin C 500 MG Chew   Take 1 tablet by mouth daily Take with iron tablets                                Vitamin D3 2000 units Tabs   Take 1 tablet by mouth daily                                ZYRTEC ALLERGY 10 MG tablet   Take 10 mg by mouth daily   Generic drug:  cetirizine                                          More Information        What Is C. diff?  C. diff is an infection caused by Clostridium difficile (C. diff) bacteria. These are germs that live in the part of your belly called your colon, or large intestine. They don't usually cause problems, but if the normal balance of good and bad bacteria in your colon changes, C. diff bacteria can grow out of control and lead to infection. This can harm your colon and cause diarrhea and belly pain.  What are the  symptoms of C. diff?  Some people with C. diff have no symptoms, but they can still pass the infection to others. Symptoms can include:    Watery diarrhea    Fever    Belly pain and cramping    Nausea and vomiting    Loss of appetite and weight loss   Who is most likely to get C. diff?  Anyone can get C. diff. But you are more likely to get the infection if you:    Are ages 65 and older    Are taking antibiotics    Have a weak immune system because of other health problems    Have inflammatory bowel disease    Have had C. diff before    Have had gastrointestinal (GI) surgery    Work or are a patient in a hospital, clinic, or nursing home  After treatment, C. diff can come back in about 1 in 4 people. If C. diff does come back, you are at higher risk for infection again in the future.   How can I lower my chance of getting C. diff again?  Take antibiotics only when you really need them. Antibiotics don't help treat illnesses caused by viruses, such as colds and the flu. Don't ask for antibiotics from your doctor if he or she says they won't work.  When you are given antibiotics, take them exactly as your doctor tells you to. Don't take more or less than the amount prescribed (the dosage). Also don't take them for a shorter or longer time than your doctor tells you to, even if you feel better.  How can I stop the spread of C. diff?  C. diff can easily spread to other people in your home or workplace. The germs can remain on your hands after using the bathroom, then spread to any person, surface, or object you touch. Here's how to not spread C. diff to other people:    Practice good handwashing. This is especially important after using the bathroom and before eating. Here's what to do: Wet your hands, scrub them with soap for 30 to 40 seconds, then rinse well and dry.    Wash your clothes, bed sheets, and towels in separate loads. Use hot water. Use both detergent and chlorine bleach.     Use chlorine bleach-based  products to disinfect surfaces you touch often, such as table tops, light switches, door knobs, and toilet seats.    Remind others to wear gloves and to wash their hands if assisting you in the bathroom.  Don't use alcohol-based hand . They don't work against C. diff.   Date Last Reviewed: 8/1/2017 2000-2017 [employer]. [address] [place] [address], [place] [address]. All rights reserved. This information is not intended as a substitute for professional medical care. Always follow your healthcare professional's instructions.                Vancomycin oral solution  What is this medicine?  VANCOMYCIN (van koe MYE sin) is a glycopeptide antibiotic. It is used to treat certain kinds of bacterial infections in the bowel. It will not work for colds, flu, or other viral infections.  How should I use this medicine?  Take this medicine by mouth. Follow the directions on the prescription label. Shake well before using. Use a specially marked spoon or dropper to measure each dose. Ask your pharmacist if you do not have one. Household spoons are not accurate. Take your medicine at regular intervals. Do not take your medicine more often than directed. Take all of your medicine as directed even if you think you are better. Do not skip doses or stop your medicine early.  Talk to your pediatrician regarding the use of this medicine in children. Special care may be needed.  What side effects may I notice from receiving this medicine?  Side effects that you should report to your doctor or health care professional as soon as possible:    allergic reactions like skin rash, itching or hives, swelling of the face, lips, or tongue    breathing difficulty    change in amount, color of urine    change in hearing    dizziness    fever, infection    redness, blistering, peeling or loosening of the skin, including inside the mouth    unusual bleeding or bruising    unusually weak or tired  Side effects that usually do not  require medical attention (report to your doctor or health care professional if they continue or are bothersome):    nausea, vomiting    stomach cramps  What may interact with this medicine?    birth control pills    cholestyramine    colestipol    vancomycin injection    What if I miss a dose?  If you miss a dose, take it as soon as you can. If it is almost time for your next dose, take only that dose. Do not take double or extra doses.  Where should I keep my medicine?  Keep out of the reach of children.  After this medicine is mixed by your pharmacist, store it in a refrigerator between 2 and 8 degrees C (36 and 46 degrees F). Do not freeze. Throw away any unused medicine after 14 days.  What should I tell my health care provider before I take this medicine?  They need to know if you have any of these conditions:    dehydration    inflammatory bowel disease    kidney disease    other chronic illness    an unusual or allergic reaction to vancomycin, other medicines, foods, dyes, or preservatives    pregnant or trying to get pregnant    breast-feeding  What should I watch for while using this medicine?  Tell your doctor or health care professional if your symptoms do not improve or if you get new symptoms.  Avoid taking this medicine within 3 or 4 hours of taking cholestyramine or colestipol.  NOTE:This sheet is a summary. It may not cover all possible information. If you have questions about this medicine, talk to your doctor, pharmacist, or health care provider. Copyright  2018 [employer]

## 2022-01-01 ENCOUNTER — HOSPITAL ENCOUNTER (EMERGENCY)
Facility: CLINIC | Age: 52
Discharge: HOME | End: 2022-01-01
Attending: EMERGENCY MEDICINE | Admitting: EMERGENCY MEDICINE
Payer: COMMERCIAL

## 2022-01-01 ENCOUNTER — APPOINTMENT (OUTPATIENT)
Dept: GENERAL RADIOLOGY | Facility: CLINIC | Age: 52
End: 2022-01-01
Attending: EMERGENCY MEDICINE
Payer: COMMERCIAL

## 2022-01-01 VITALS
WEIGHT: 221 LBS | RESPIRATION RATE: 20 BRPM | OXYGEN SATURATION: 96 % | SYSTOLIC BLOOD PRESSURE: 139 MMHG | HEART RATE: 81 BPM | BODY MASS INDEX: 34.61 KG/M2 | DIASTOLIC BLOOD PRESSURE: 98 MMHG | TEMPERATURE: 97.3 F

## 2022-01-01 DIAGNOSIS — R07.9 CHEST PAIN, UNSPECIFIED TYPE: ICD-10-CM

## 2022-01-01 DIAGNOSIS — F17.200 CURRENT SMOKER: ICD-10-CM

## 2022-01-01 DIAGNOSIS — I10 ESSENTIAL HYPERTENSION: ICD-10-CM

## 2022-01-01 DIAGNOSIS — G47.00 INSOMNIA, UNSPECIFIED TYPE: ICD-10-CM

## 2022-01-01 DIAGNOSIS — F43.9 STRESS: ICD-10-CM

## 2022-01-01 LAB
ANION GAP SERPL CALCULATED.3IONS-SCNC: 7 MMOL/L (ref 3–14)
BUN SERPL-MCNC: 19 MG/DL (ref 7–30)
CALCIUM SERPL-MCNC: 8.5 MG/DL (ref 8.5–10.1)
CHLORIDE BLD-SCNC: 101 MMOL/L (ref 94–109)
CO2 SERPL-SCNC: 28 MMOL/L (ref 20–32)
CREAT SERPL-MCNC: 0.8 MG/DL (ref 0.66–1.25)
ERYTHROCYTE [DISTWIDTH] IN BLOOD BY AUTOMATED COUNT: 12.9 % (ref 10–15)
GFR SERPL CREATININE-BSD FRML MDRD: >90 ML/MIN/1.73M2
GLUCOSE BLD-MCNC: 106 MG/DL (ref 70–99)
HCT VFR BLD AUTO: 50.1 % (ref 40–53)
HGB BLD-MCNC: 17 G/DL (ref 13.3–17.7)
HOLD SPECIMEN: NORMAL
MCH RBC QN AUTO: 31.5 PG (ref 26.5–33)
MCHC RBC AUTO-ENTMCNC: 33.9 G/DL (ref 31.5–36.5)
MCV RBC AUTO: 93 FL (ref 78–100)
PLATELET # BLD AUTO: 324 10E3/UL (ref 150–450)
POTASSIUM BLD-SCNC: 3.7 MMOL/L (ref 3.4–5.3)
RBC # BLD AUTO: 5.39 10E6/UL (ref 4.4–5.9)
SODIUM SERPL-SCNC: 136 MMOL/L (ref 133–144)
TROPONIN I SERPL HS-MCNC: 4 NG/L
WBC # BLD AUTO: 6.7 10E3/UL (ref 4–11)

## 2022-01-01 PROCEDURE — 250N000009 HC RX 250: Performed by: EMERGENCY MEDICINE

## 2022-01-01 PROCEDURE — 80048 BASIC METABOLIC PNL TOTAL CA: CPT | Performed by: EMERGENCY MEDICINE

## 2022-01-01 PROCEDURE — 99285 EMERGENCY DEPT VISIT HI MDM: CPT | Mod: 25

## 2022-01-01 PROCEDURE — 250N000013 HC RX MED GY IP 250 OP 250 PS 637: Performed by: EMERGENCY MEDICINE

## 2022-01-01 PROCEDURE — 85027 COMPLETE CBC AUTOMATED: CPT | Performed by: EMERGENCY MEDICINE

## 2022-01-01 PROCEDURE — 93005 ELECTROCARDIOGRAM TRACING: CPT

## 2022-01-01 PROCEDURE — 71046 X-RAY EXAM CHEST 2 VIEWS: CPT

## 2022-01-01 PROCEDURE — 36415 COLL VENOUS BLD VENIPUNCTURE: CPT | Performed by: EMERGENCY MEDICINE

## 2022-01-01 PROCEDURE — 84484 ASSAY OF TROPONIN QUANT: CPT | Performed by: EMERGENCY MEDICINE

## 2022-01-01 RX ORDER — BUPROPION HYDROCHLORIDE 150 MG/1
150 TABLET, EXTENDED RELEASE ORAL
COMMUNITY
Start: 2021-11-18

## 2022-01-01 RX ORDER — TRIAMTERENE/HYDROCHLOROTHIAZID 37.5-25 MG
1 TABLET ORAL
COMMUNITY
Start: 2021-12-29

## 2022-01-01 RX ORDER — SUCRALFATE 1 G/1
1 TABLET ORAL 4 TIMES DAILY PRN
Qty: 20 TABLET | Refills: 0 | Status: SHIPPED | OUTPATIENT
Start: 2022-01-01

## 2022-01-01 RX ORDER — HYDROXYZINE HYDROCHLORIDE 25 MG/1
25-50 TABLET, FILM COATED ORAL EVERY 8 HOURS PRN
Qty: 20 TABLET | Refills: 0 | Status: SHIPPED | OUTPATIENT
Start: 2022-01-01

## 2022-01-01 RX ORDER — TRAZODONE HYDROCHLORIDE 100 MG/1
50-100 TABLET ORAL
Qty: 10 TABLET | Refills: 0 | Status: SHIPPED | OUTPATIENT
Start: 2022-01-01

## 2022-01-01 RX ADMIN — LIDOCAINE HYDROCHLORIDE 30 ML: 20 SOLUTION ORAL; TOPICAL at 19:11

## 2022-01-01 ASSESSMENT — ENCOUNTER SYMPTOMS: WEAKNESS: 1

## 2022-01-01 NOTE — ED TRIAGE NOTES
Pt here with c/o non-radiating, continuous mid-sternal CP x1 week. No SOB, LILLI, n/v or diaphoresis. Recent change in BP meds. ABC intact. A&Ox4.

## 2022-01-02 NOTE — DISCHARGE INSTRUCTIONS
Discharge Instructions  Chest Pain    You have been seen today for chest pain or discomfort.  At this time, your provider has found no signs that your chest pain is due to a serious or life-threatening condition, (or you have declined more testing and/or admission to the hospital). However, sometimes there is a serious problem that does not show up right away. Your evaluation today may not be complete and you may need further testing and evaluation.     Generally, every Emergency Department visit should have a follow-up clinic visit with either a primary or a specialty clinic/provider. Please follow-up as instructed by your emergency provider today.  Return to the Emergency Department if:  Your chest pain changes, gets worse, starts to happen more often, or comes with less activity.  You are newly short of breath.  You get very weak or tired.  You pass out or faint.  You have any new symptoms, like fever, cough, numb legs, or you cough up blood.  You have anything else that worries you.    Until you follow-up with your regular provider, please do the following:  Take one aspirin daily unless you have an allergy or are told not to by your provider.  If a stress test appointment has been made, go to the appointment.  If you have questions, contact your regular provider.  Follow-up with your regular provider/clinic as directed; this is very important.    If you were given a prescription for medicine here today, be sure to read all of the information (including the package insert) that comes with your prescription.  This will include important information about the medicine, its side effects, and any warnings that you need to know about.  The pharmacist who fills the prescription can provide more information and answer questions you may have about the medicine.  If you have questions or concerns that the pharmacist cannot address, please call or return to the Emergency Department.       Remember that you can always come  back to the Emergency Department if you are not able to see your regular provider in the amount of time listed above, if you get any new symptoms, or if there is anything that worries you.    Discharge Instructions  Hypertension - High Blood Pressure    During you visit to the Emergency Department, your blood pressure was higher than the recommended blood pressure.  This may be related to stress, pain, medication or other temporary conditions. In these cases, your blood pressure may return to normal on its own. If you have a history of high blood pressure, you may need to have your provider adjust your medications. Sometimes, your high measurement here may indicate that you have developed high blood pressure that will stay high unless it is treated. As a general rule, high blood pressure causes problems over years rather than days, weeks, or months. So, while it is important to treat blood pressure, it is rarely important to treat blood pressure immediately. Occasionally we will begin a medication in the Emergency Department; more often we will recommend close follow-up for medications with a primary doctor/clinic.    Generally, every Emergency Department visit should have a follow-up clinic visit with either a primary or a specialty clinic/provider. Please follow-up as instructed by your emergency provider today.    Return to the Emergency Department if you start to have:  A severe headache.  Chest pain.  Shortness of breath.  Weakness or numbness that affects one part of the body.  Confusion.  Vision changes.  Significant swelling of legs and/or eyes.  A reaction to any medication started in the Emergency Department.    What can I do to help myself?  Avoid alcohol.  Take any blood pressure medicine that you are prescribed.  Get a good night s sleep.  Lower your salt intake.  Exercise.  Lose weight.  Manage stress.  See your doctor regularly    If blood pressure medication was started in the Emergency  Department:  The medicine may not have an immediate effect. The body and brain determine what blood pressure you have. The medicine s job is to retrain the body s  thermostat  to a lower blood pressure.  You will need to follow up with your provider to see how this medicine is working for you.  If you were given a prescription for medicine here today, be sure to read all of the information (including the package insert) that comes with your prescription.  This will include important information about the medicine, its side effects, and any warnings that you need to know about.  The pharmacist who fills the prescription can provide more information and answer questions you may have about the medicine.  If you have questions or concerns that the pharmacist cannot address, please call or return to the Emergency Department.   Remember that you can always come back to the Emergency Department if you are not able to see your regular provider in the amount of time listed above, if you get any new symptoms, or if there is anything that worries you.

## 2022-01-02 NOTE — ED PROVIDER NOTES
History   Chief Complaint:  Chest Pain       HPI   Kayden Schmitz is a 51 year old male with history of hypertension, alcohol abuse, and obesity who reports mid-sternal chest pain for the past week. He was prescribed amlodipine on 12/17/21 and began having dull, achy, mid-sternal chest pain for 5 days after beginning this medication. He stopped taking amlodipine a few days ago due to thinking it was contributing and was prescribed triamterene-hydrochlorothiazide by his PCP. He also describes feeling weak and having numbness in his fingers bilaterally. The patient has had increased stressors and anxiety recently as well.  No shortness of breath or nausea.  Sweating is normal for him and not increased.  Current symptoms are not exertional. The patient notes that he had an episode of weakness and chest pain in October while doing heavy lifting. He measured his blood pressure to be 175/125 at home today.      Review of Systems   Cardiovascular: Positive for chest pain.   Neurological: Positive for weakness.   All other systems reviewed and are negative.      Allergies:  Fish  Lisinopril  Nicotine    Medications:  Albuterol  Cetirizine  Ferrous sulfate  Fexofenadine  Omeprazole  Sildenafil  Loratadine  Bupropion  Amlodipine    Past Medical History:     Hypertension  Closed fracture of right distal fibula  Chronic thoracic back pain  Tobacco use  Erectile dysfunction  Obesity  Adenomatous polyp of colon  Lung nodule  GERD  Asthma  Acute gastrointestinal hemorrhage  C. difficile colitis  Alcohol abuse  Anemia, iron deficiency  Dyslipidemia  Calculus of ureter    Past Surgical History:    EGD, combined  Excision pilonidal lesion  Repair inguinal hernia  Gastric bypass    Family History:    Father: ALS, alcohol abuse, type 2 diabetes, high blood pressure, high cholesterol, hyperlipidemia, obesity  Mother: obesity    Social History:  Presents with wife  Smoker    Physical Exam     Patient Vitals for the past 24  hrs:   BP Temp Temp src Pulse Resp SpO2 Weight   01/01/22 1900 (!) 139/98 -- -- 81 -- 96 % --   01/01/22 1757 (!) 157/104 -- -- -- -- -- --   01/01/22 1709 (!) 145/96 97.3  F (36.3  C) Temporal 102 20 96 % 100.2 kg (221 lb)       Physical Exam  Eyes:               Sclera white; Pupils are equal and round  ENT:                External ears and nares normal  CV:                  Rate as above with regular rhythm                           No BLE edema  Resp:               Breath sounds clear and equal bilaterally                          Non-labored, no retractions or accessory muscle use  GI:                   Abdomen is soft, non-tender, negative Hinds's, non-distended                          No rebound tenderness or peritoneal features  MS:                  Moves all extremities  Skin:                Warm and dry  Neuro:             Speech is normal and fluent. No apparent deficit.      Emergency Department Course   ECG  ECG obtained at 1716, ECG read at 1815  Normal sinus rhythm  Possible left atrial enlargement  Left ventricular hypertrophy  Abnormal ECG   No prior ECG for comparison  Rate 88 bpm. FL interval 146 ms. QRS duration 94 ms. QT/QTc 368/445 ms. P-R-T axes 51 -26 -17.     Imaging:  XR Chest 2 Views   Final Result   IMPRESSION: Negative chest.      Echo Stress Echocardiogram    (Results Pending)     Report per radiology    Laboratory:  Labs Ordered and Resulted from Time of ED Arrival to Time of ED Departure   BASIC METABOLIC PANEL - Abnormal       Result Value    Sodium 136      Potassium 3.7      Chloride 101      Carbon Dioxide (CO2) 28      Anion Gap 7      Urea Nitrogen 19      Creatinine 0.80      Calcium 8.5      Glucose 106 (*)     GFR Estimate >90     TROPONIN I - Normal    Troponin I High Sensitivity 4     CBC WITH PLATELETS - Normal    WBC Count 6.7      RBC Count 5.39      Hemoglobin 17.0      Hematocrit 50.1      MCV 93      MCH 31.5      MCHC 33.9      RDW 12.9      Platelet Count 324          Emergency Department Course:     Reviewed:  I reviewed nursing notes, vitals, past medical history and Care Everywhere    Assessments:  1814 I obtained history and examined the patient as noted above.    1905 I rechecked the patient and explained findings.    1919 I rechecked and updated the patient.    Interventions:  1911 GI Cocktail 30 mL PO    Disposition:  The patient was discharged to home.     Impression & Plan     Medical Decision Making:  Kayden Schmitz is here for evaluation of continuous chest pain.  EKG was obtained immediately and demonstrates no ischemic changes, pre-excitation, pericarditis or dysrhythmia.  Differential includes NSTEMI, pneumonia, pneumothorax, pleural effusion, esophageal spasm, GERD.  PE not clinically suspected.  Blood work and CXR reassuring.  Troponin undetectable.  With duration of symptoms, single troponin is sufficient for ACS rule out.  Patient has a low risk HEART score of 3 and is appropriate for outpatient management.  Stress test ordered.  F/u PCP for risk factor modification and review of results.  Has also been having difficulty with sleep and stress.  Short course of trazodone and hydroxyzine prescribed.  Will try carafate in addition to his omeprazole as GI pathology could also contribute to his symptoms.  Recently stopped Wellbutrin which he was using for smoking cessation.  Still smokes.  If noticing worsening stress/anxiety symptoms then resume wellbutrin as it can treat these symptoms as well.  Reasons for immediate return discussed.    Diagnosis:    ICD-10-CM    1. Chest pain, unspecified type  R07.9 Echo Stress Echocardiogram   2. Essential hypertension  I10    3. Current smoker  F17.200    4. Insomnia, unspecified type  G47.00    5. Stress  F43.9        Discharge Medications:  New Prescriptions    HYDROXYZINE (ATARAX) 25 MG TABLET    Take 1-2 tablets (25-50 mg) by mouth every 8 hours as needed (anxiety, sleep)    SUCRALFATE (CARAFATE) 1 GM TABLET     Take 1 tablet (1 g) by mouth 4 times daily as needed (pain)    TRAZODONE (DESYREL) 100 MG TABLET    Take 0.5-1 tablets ( mg) by mouth nightly as needed for sleep       Scribe Disclosure:  I, Joel Raquel, am serving as a scribe at 6:09 PM on 1/1/2022 to document services personally performed by Jahaira Carnes MD based on my observations and the provider's statements to me.         Jahaira Carnes MD  01/01/22 2034

## 2022-01-03 LAB
ATRIAL RATE - MUSE: 88 BPM
DIASTOLIC BLOOD PRESSURE - MUSE: NORMAL MMHG
INTERPRETATION ECG - MUSE: NORMAL
P AXIS - MUSE: 51 DEGREES
PR INTERVAL - MUSE: 146 MS
QRS DURATION - MUSE: 94 MS
QT - MUSE: 368 MS
QTC - MUSE: 445 MS
R AXIS - MUSE: -26 DEGREES
SYSTOLIC BLOOD PRESSURE - MUSE: NORMAL MMHG
T AXIS - MUSE: -17 DEGREES
VENTRICULAR RATE- MUSE: 88 BPM

## 2022-01-17 ENCOUNTER — HOSPITAL ENCOUNTER (OUTPATIENT)
Dept: CARDIOLOGY | Facility: CLINIC | Age: 52
Discharge: HOME | End: 2022-01-17
Attending: EMERGENCY MEDICINE | Admitting: EMERGENCY MEDICINE
Payer: COMMERCIAL

## 2022-01-17 DIAGNOSIS — R07.9 CHEST PAIN, UNSPECIFIED TYPE: ICD-10-CM

## 2022-01-17 PROCEDURE — 93018 CV STRESS TEST I&R ONLY: CPT | Performed by: INTERNAL MEDICINE

## 2022-01-17 PROCEDURE — 93016 CV STRESS TEST SUPVJ ONLY: CPT | Performed by: INTERNAL MEDICINE

## 2022-01-17 PROCEDURE — 93325 DOPPLER ECHO COLOR FLOW MAPG: CPT | Mod: TC

## 2022-01-17 PROCEDURE — 93350 STRESS TTE ONLY: CPT | Mod: 26 | Performed by: INTERNAL MEDICINE

## 2022-01-17 PROCEDURE — 93321 DOPPLER ECHO F-UP/LMTD STD: CPT | Mod: 26 | Performed by: INTERNAL MEDICINE

## 2022-01-17 PROCEDURE — 93325 DOPPLER ECHO COLOR FLOW MAPG: CPT | Mod: 26 | Performed by: INTERNAL MEDICINE

## 2022-01-17 PROCEDURE — 93350 STRESS TTE ONLY: CPT | Mod: TC

## 2022-01-17 PROCEDURE — 93017 CV STRESS TEST TRACING ONLY: CPT

## (undated) DEVICE — KIT ENDO TURNOVER/PROCEDURE W/CLEAN A SCOPE LINERS 103888

## (undated) DEVICE — ENDO FORCEP ENDOJAW BIOPSY 2.8MMX160CM FB-220K

## (undated) RX ORDER — FENTANYL CITRATE 50 UG/ML
INJECTION, SOLUTION INTRAMUSCULAR; INTRAVENOUS
Status: DISPENSED
Start: 2018-07-25